# Patient Record
Sex: MALE | Race: WHITE | NOT HISPANIC OR LATINO | Employment: FULL TIME | URBAN - METROPOLITAN AREA
[De-identification: names, ages, dates, MRNs, and addresses within clinical notes are randomized per-mention and may not be internally consistent; named-entity substitution may affect disease eponyms.]

---

## 2022-05-02 ENCOUNTER — TELEPHONE (OUTPATIENT)
Dept: BEHAVIORAL HEALTH | Facility: CLINIC | Age: 32
End: 2022-05-02
Payer: COMMERCIAL

## 2022-05-02 ENCOUNTER — HOSPITAL ENCOUNTER (EMERGENCY)
Facility: CLINIC | Age: 32
Discharge: HOME | End: 2022-05-03
Attending: EMERGENCY MEDICINE | Admitting: EMERGENCY MEDICINE
Payer: COMMERCIAL

## 2022-05-02 DIAGNOSIS — F16.10 HALLUCINOGENIC MUSHROOMS USE DISORDER, MILD (H): ICD-10-CM

## 2022-05-02 DIAGNOSIS — Z71.1 MENTAL HEALTH-RELATED COMPLAINT: ICD-10-CM

## 2022-05-02 DIAGNOSIS — R45.851 SUICIDE IDEATION: ICD-10-CM

## 2022-05-02 DIAGNOSIS — R45.850 HOMICIDAL IDEATION: ICD-10-CM

## 2022-05-02 LAB
ALBUMIN SERPL-MCNC: 4.1 G/DL (ref 3.4–5)
ALP SERPL-CCNC: 53 U/L (ref 40–150)
ALT SERPL W P-5'-P-CCNC: 45 U/L (ref 0–70)
AMPHETAMINES UR QL SCN: ABNORMAL
ANION GAP SERPL CALCULATED.3IONS-SCNC: 6 MMOL/L (ref 3–14)
AST SERPL W P-5'-P-CCNC: 20 U/L (ref 0–45)
BARBITURATES UR QL: ABNORMAL
BASOPHILS # BLD AUTO: 0 10E3/UL (ref 0–0.2)
BASOPHILS NFR BLD AUTO: 1 %
BENZODIAZ UR QL: ABNORMAL
BILIRUB SERPL-MCNC: 0.3 MG/DL (ref 0.2–1.3)
BUN SERPL-MCNC: 12 MG/DL (ref 7–30)
CALCIUM SERPL-MCNC: 9.4 MG/DL (ref 8.5–10.1)
CANNABINOIDS UR QL SCN: ABNORMAL
CHLORIDE BLD-SCNC: 110 MMOL/L (ref 94–109)
CO2 SERPL-SCNC: 24 MMOL/L (ref 20–32)
COCAINE UR QL: ABNORMAL
CREAT SERPL-MCNC: 1.03 MG/DL (ref 0.66–1.25)
EOSINOPHIL # BLD AUTO: 0.2 10E3/UL (ref 0–0.7)
EOSINOPHIL NFR BLD AUTO: 3 %
ERYTHROCYTE [DISTWIDTH] IN BLOOD BY AUTOMATED COUNT: 12.6 % (ref 10–15)
GFR SERPL CREATININE-BSD FRML MDRD: >90 ML/MIN/1.73M2
GLUCOSE BLD-MCNC: 118 MG/DL (ref 70–99)
HCT VFR BLD AUTO: 45.2 % (ref 40–53)
HGB BLD-MCNC: 15.5 G/DL (ref 13.3–17.7)
IMM GRANULOCYTES # BLD: 0 10E3/UL
IMM GRANULOCYTES NFR BLD: 1 %
LYMPHOCYTES # BLD AUTO: 2 10E3/UL (ref 0.8–5.3)
LYMPHOCYTES NFR BLD AUTO: 26 %
MCH RBC QN AUTO: 30.9 PG (ref 26.5–33)
MCHC RBC AUTO-ENTMCNC: 34.3 G/DL (ref 31.5–36.5)
MCV RBC AUTO: 90 FL (ref 78–100)
MONOCYTES # BLD AUTO: 0.8 10E3/UL (ref 0–1.3)
MONOCYTES NFR BLD AUTO: 10 %
NEUTROPHILS # BLD AUTO: 4.8 10E3/UL (ref 1.6–8.3)
NEUTROPHILS NFR BLD AUTO: 59 %
NRBC # BLD AUTO: 0 10E3/UL
NRBC BLD AUTO-RTO: 0 /100
OPIATES UR QL SCN: ABNORMAL
PLATELET # BLD AUTO: 270 10E3/UL (ref 150–450)
POTASSIUM BLD-SCNC: 3.7 MMOL/L (ref 3.4–5.3)
PROT SERPL-MCNC: 7.1 G/DL (ref 6.8–8.8)
RBC # BLD AUTO: 5.02 10E6/UL (ref 4.4–5.9)
SARS-COV-2 RNA RESP QL NAA+PROBE: NEGATIVE
SODIUM SERPL-SCNC: 140 MMOL/L (ref 133–144)
WBC # BLD AUTO: 7.9 10E3/UL (ref 4–11)

## 2022-05-02 PROCEDURE — 250N000013 HC RX MED GY IP 250 OP 250 PS 637: Performed by: EMERGENCY MEDICINE

## 2022-05-02 PROCEDURE — 80307 DRUG TEST PRSMV CHEM ANLYZR: CPT | Performed by: EMERGENCY MEDICINE

## 2022-05-02 PROCEDURE — U0003 INFECTIOUS AGENT DETECTION BY NUCLEIC ACID (DNA OR RNA); SEVERE ACUTE RESPIRATORY SYNDROME CORONAVIRUS 2 (SARS-COV-2) (CORONAVIRUS DISEASE [COVID-19]), AMPLIFIED PROBE TECHNIQUE, MAKING USE OF HIGH THROUGHPUT TECHNOLOGIES AS DESCRIBED BY CMS-2020-01-R: HCPCS | Performed by: EMERGENCY MEDICINE

## 2022-05-02 PROCEDURE — 80053 COMPREHEN METABOLIC PANEL: CPT | Performed by: EMERGENCY MEDICINE

## 2022-05-02 PROCEDURE — 99285 EMERGENCY DEPT VISIT HI MDM: CPT | Mod: 25 | Performed by: EMERGENCY MEDICINE

## 2022-05-02 PROCEDURE — 99285 EMERGENCY DEPT VISIT HI MDM: CPT | Performed by: EMERGENCY MEDICINE

## 2022-05-02 PROCEDURE — 90791 PSYCH DIAGNOSTIC EVALUATION: CPT

## 2022-05-02 PROCEDURE — 85048 AUTOMATED LEUKOCYTE COUNT: CPT | Performed by: EMERGENCY MEDICINE

## 2022-05-02 PROCEDURE — 85018 HEMOGLOBIN: CPT | Performed by: EMERGENCY MEDICINE

## 2022-05-02 PROCEDURE — 36415 COLL VENOUS BLD VENIPUNCTURE: CPT | Performed by: EMERGENCY MEDICINE

## 2022-05-02 RX ORDER — LAMOTRIGINE 200 MG/1
200 TABLET ORAL DAILY
Status: ON HOLD | COMMUNITY
Start: 2022-04-19 | End: 2022-05-23

## 2022-05-02 RX ORDER — LITHIUM CARBONATE 600 MG/1
600 CAPSULE ORAL 2 TIMES DAILY
Status: DISCONTINUED | OUTPATIENT
Start: 2022-05-02 | End: 2022-05-03

## 2022-05-02 RX ORDER — ALBUTEROL SULFATE 90 UG/1
1-2 AEROSOL, METERED RESPIRATORY (INHALATION) EVERY 4 HOURS PRN
Status: ON HOLD | COMMUNITY
Start: 2022-04-13 | End: 2022-05-23

## 2022-05-02 RX ORDER — PROPRANOLOL HYDROCHLORIDE 40 MG/1
40 TABLET ORAL 3 TIMES DAILY
COMMUNITY
End: 2022-05-02

## 2022-05-02 RX ORDER — PROPRANOLOL HCL 60 MG
60 CAPSULE, EXTENDED RELEASE 24HR ORAL DAILY
Status: DISCONTINUED | OUTPATIENT
Start: 2022-05-03 | End: 2022-05-03 | Stop reason: HOSPADM

## 2022-05-02 RX ORDER — LAMOTRIGINE 200 MG/1
200 TABLET ORAL DAILY
Status: DISCONTINUED | OUTPATIENT
Start: 2022-05-03 | End: 2022-05-03 | Stop reason: HOSPADM

## 2022-05-02 RX ORDER — PROPRANOLOL HCL 60 MG
60 CAPSULE, EXTENDED RELEASE 24HR ORAL DAILY
Status: ON HOLD | COMMUNITY
Start: 2022-04-19 | End: 2022-05-23

## 2022-05-02 RX ORDER — ALBUTEROL SULFATE 90 UG/1
1-2 AEROSOL, METERED RESPIRATORY (INHALATION) EVERY 4 HOURS PRN
Status: DISCONTINUED | OUTPATIENT
Start: 2022-05-02 | End: 2022-05-03 | Stop reason: HOSPADM

## 2022-05-02 RX ORDER — QUETIAPINE FUMARATE 25 MG/1
25 TABLET, FILM COATED ORAL AT BEDTIME
Status: DISCONTINUED | OUTPATIENT
Start: 2022-05-02 | End: 2022-05-03 | Stop reason: HOSPADM

## 2022-05-02 RX ORDER — QUETIAPINE FUMARATE 25 MG/1
TABLET, FILM COATED ORAL
COMMUNITY
Start: 2021-07-13 | End: 2022-05-17

## 2022-05-02 RX ORDER — LITHIUM CARBONATE 600 MG/1
CAPSULE ORAL
COMMUNITY
Start: 2022-04-19 | End: 2022-05-17

## 2022-05-02 RX ADMIN — QUETIAPINE FUMARATE 25 MG: 25 TABLET ORAL at 23:28

## 2022-05-02 RX ADMIN — LITHIUM CARBONATE 600 MG: 600 CAPSULE ORAL at 23:28

## 2022-05-02 NOTE — ED NOTES
"[facility] ED Mental Health Handoff Note:       Brief HPI:  This is a 32 year old male signed out to me by Dr. Bowens.  See initial ED Provider note for full details of the presentation. Interval history is pertinent for patient with HI on shrooms. Placed on 72 hour hold.    Home meds reviewed and ordered/administered: Yes    Medically stable for inpatient mental health admission: Yes.    Evaluated by mental health: Yes. The recommendation is for inpatient mental health treatment. Bed search in process    Safety concerns: At the time I received sign out, there were no safety concerns.    Hold Status:  Active Orders   Legal    Emergency Hospitalization Hold (72 Hr Hold)     Frequency: Effective Now     Start Date/Time: 05/02/22 0354      Number of Occurrences: Until Specified            Exam:   Patient Vitals for the past 24 hrs:   BP Temp Temp src Pulse Resp SpO2 Height Weight   05/02/22 0915 (!) 129/90 97.7  F (36.5  C) Oral 55 18 99 % -- --   05/02/22 0031 135/88 99.7  F (37.6  C) Oral 78 16 97 % -- --   05/02/22 0027 -- -- -- -- -- -- 1.803 m (5' 11\") 82.6 kg (182 lb)           ED Course:    Medications - No data to display         There were no significant events during my shift.    Patient was signed out to the oncoming provider, Dr. Barrios.      Impression:    ICD-10-CM    1. Mental health-related complaint  Z71.1    2. Suicide ideation  R45.851    3. Homicidal ideation  R45.850        Plan:    1. Awaiting inpatient mental health admission/transfer.      RESULTS:   Results for orders placed or performed during the hospital encounter of 05/02/22 (from the past 24 hour(s))   Urine Drugs of Abuse Screen     Status: Abnormal    Collection Time: 05/02/22  9:14 AM    Narrative    The following orders were created for panel order Urine Drugs of Abuse Screen.  Procedure                               Abnormality         Status                     ---------                               -----------         " ------                     Drug abuse screen 1 urin...[[id number]]  Abnormal            Final result                 Please view results for these tests on the individual orders.   Drug abuse screen 1 urine (ED)     Status: Abnormal    Collection Time: 05/02/22  9:14 AM   Result Value Ref Range    Amphetamines Urine Screen Negative Screen Negative    Barbiturates Urine Screen Negative Screen Negative    Benzodiazepines Urine Screen Negative Screen Negative    Cannabinoids Urine Screen Positive (A) Screen Negative    Cocaine Urine Screen Negative Screen Negative    Opiates Urine Screen Negative Screen Negative   Asymptomatic COVID-19 Virus (Coronavirus) by PCR Nose     Status: Normal    Collection Time: 05/02/22  9:26 AM    Specimen: Nose; Swab   Result Value Ref Range    SARS CoV2 PCR Negative Negative    Narrative    Testing was performed using the geovany  SARS-CoV-2 & Influenza A/B Assay on the geovany  Rosemary  System.  This test should be ordered for the detection of SARS-COV-2 in individuals who meet SARS-CoV-2 clinical and/or epidemiological criteria. Test performance is unknown in asymptomatic patients.  This test is for in vitro diagnostic use under the FDA EUA for laboratories certified under CLIA to perform moderate and/or high complexity testing. This test has not been FDA cleared or approved.  A negative test does not rule out the presence of PCR inhibitors in the specimen or target RNA in concentration below the limit of detection for the assay. The possibility of a false negative should be considered if the patient's recent exposure or clinical presentation suggests COVID-19.  [facility] are certified under the Clinical Laboratory Improvement Amendments of 1988 (CLIA-88) as qualified to perform moderate and/or high complexity laboratory testing.   CBC with platelets differential     Status: None    Collection Time: 05/02/22  9:27 AM    Narrative    The following orders were created for  panel order CBC with platelets differential.  Procedure                               Abnormality         Status                     ---------                               -----------         ------                     CBC with platelets and d...[[id number]]                      Final result                 Please view results for these tests on the individual orders.   Comprehensive metabolic panel     Status: Abnormal    Collection Time: 05/02/22  9:27 AM   Result Value Ref Range    Sodium 140 133 - 144 mmol/L    Potassium 3.7 3.4 - 5.3 mmol/L    Chloride 110 (H) 94 - 109 mmol/L    Carbon Dioxide (CO2) 24 20 - 32 mmol/L    Anion Gap 6 3 - 14 mmol/L    Urea Nitrogen 12 7 - 30 mg/dL    Creatinine 1.03 0.66 - 1.25 mg/dL    Calcium 9.4 8.5 - 10.1 mg/dL    Glucose 118 (H) 70 - 99 mg/dL    Alkaline Phosphatase 53 40 - 150 U/L    AST 20 0 - 45 U/L    ALT 45 0 - 70 U/L    Protein Total 7.1 6.8 - 8.8 g/dL    Albumin 4.1 3.4 - 5.0 g/dL    Bilirubin Total 0.3 0.2 - 1.3 mg/dL    GFR Estimate >90 >60 mL/min/1.73m2   CBC with platelets and differential     Status: None    Collection Time: 05/02/22  9:27 AM   Result Value Ref Range    WBC Count 7.9 4.0 - 11.0 10e3/uL    RBC Count 5.02 4.40 - 5.90 10e6/uL    Hemoglobin 15.5 13.3 - 17.7 g/dL    Hematocrit 45.2 40.0 - 53.0 %    MCV 90 78 - 100 fL    MCH 30.9 26.5 - 33.0 pg    MCHC 34.3 31.5 - 36.5 g/dL    RDW 12.6 10.0 - 15.0 %    Platelet Count 270 150 - 450 10e3/uL    % Neutrophils 59 %    % Lymphocytes 26 %    % Monocytes 10 %    % Eosinophils 3 %    % Basophils 1 %    % Immature Granulocytes 1 %    NRBCs per 100 WBC 0 <1 /100    Absolute Neutrophils 4.8 1.6 - 8.3 10e3/uL    Absolute Lymphocytes 2.0 0.8 - 5.3 10e3/uL    Absolute Monocytes 0.8 0.0 - 1.3 10e3/uL    Absolute Eosinophils 0.2 0.0 - 0.7 10e3/uL    Absolute Basophils 0.0 0.0 - 0.2 10e3/uL    Absolute Immature Granulocytes 0.0 <=0.4 10e3/uL    Absolute NRBCs 0.0 10e3/uL             Rachel Delgado MD                      Rachel Delgado MD  05/02/22 5128
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
## 2022-05-02 NOTE — PROGRESS NOTES
Patient can call friend who brought him in, Darshana Man at [phone] or [phone].  She stated that he won't have his phone and he will probably want to call her.

## 2022-05-02 NOTE — ED NOTES
"[facility] ED Mental Health Handoff Note:       Brief HPI:  This is a 32 year old male signed out to me by Dr. Barrios.  See initial ED Provider note for full details of the presentation. No new events.    Home meds reviewed and ordered/administered:     Medically stable for inpatient mental health admission: Yes.    Evaluated by mental health: Yes. The recommendation is for inpatient mental health treatment. Bed search in process    Safety concerns: At the time I received sign out, calm and cooperative    Hold Status:  Active Orders   Legal    Emergency Hospitalization Hold (72 Hr Hold)     Frequency: Effective Now     Start Date/Time: 05/02/22 0354      Number of Occurrences: Until Specified            Exam:   Patient Vitals for the past 24 hrs:   BP Temp Temp src Pulse Resp SpO2 Height Weight   05/02/22 0915 (!) 129/90 97.7  F (36.5  C) Oral 55 18 99 % -- --   05/02/22 0031 135/88 99.7  F (37.6  C) Oral 78 16 97 % -- --   05/02/22 0027 -- -- -- -- -- -- 1.803 m (5' 11\") 82.6 kg (182 lb)       ED Course:    Medications - No data to display         There were no significant events during my shift.    Patient was signed out to the oncoming provider, Dr. Delgado      Impression:    ICD-10-CM    1. Mental health-related complaint  Z71.1    2. Suicide ideation  R45.851    3. Homicidal ideation  R45.850        Plan:    1. Awaiting inpatient mental health admission/transfer.      RESULTS:   Results for orders placed or performed during the hospital encounter of 05/02/22 (from the past 24 hour(s))   Urine Drugs of Abuse Screen     Status: Abnormal    Collection Time: 05/02/22  9:14 AM    Narrative    The following orders were created for panel order Urine Drugs of Abuse Screen.  Procedure                               Abnormality         Status                     ---------                               -----------         ------                     Drug abuse screen 1 urin...[[id number]]  Abnormal            Final " result                 Please view results for these tests on the individual orders.   Drug abuse screen 1 urine (ED)     Status: Abnormal    Collection Time: 05/02/22  9:14 AM   Result Value Ref Range    Amphetamines Urine Screen Negative Screen Negative    Barbiturates Urine Screen Negative Screen Negative    Benzodiazepines Urine Screen Negative Screen Negative    Cannabinoids Urine Screen Positive (A) Screen Negative    Cocaine Urine Screen Negative Screen Negative    Opiates Urine Screen Negative Screen Negative   Asymptomatic COVID-19 Virus (Coronavirus) by PCR Nose     Status: Normal    Collection Time: 05/02/22  9:26 AM    Specimen: Nose; Swab   Result Value Ref Range    SARS CoV2 PCR Negative Negative    Narrative    Testing was performed using the geovany  SARS-CoV-2 & Influenza A/B Assay on the geovany  Rosemary  System.  This test should be ordered for the detection of SARS-COV-2 in individuals who meet SARS-CoV-2 clinical and/or epidemiological criteria. Test performance is unknown in asymptomatic patients.  This test is for in vitro diagnostic use under the FDA EUA for laboratories certified under CLIA to perform moderate and/or high complexity testing. This test has not been FDA cleared or approved.  A negative test does not rule out the presence of PCR inhibitors in the specimen or target RNA in concentration below the limit of detection for the assay. The possibility of a false negative should be considered if the patient's recent exposure or clinical presentation suggests COVID-19.  [facility] are certified under the Clinical Laboratory Improvement Amendments of 1988 (CLIA-88) as qualified to perform moderate and/or high complexity laboratory testing.   CBC with platelets differential     Status: None    Collection Time: 05/02/22  9:27 AM    Narrative    The following orders were created for panel order CBC with platelets differential.  Procedure                                Abnormality         Status                     ---------                               -----------         ------                     CBC with platelets and d...[[id number]]                      Final result                 Please view results for these tests on the individual orders.   Comprehensive metabolic panel     Status: Abnormal    Collection Time: 05/02/22  9:27 AM   Result Value Ref Range    Sodium 140 133 - 144 mmol/L    Potassium 3.7 3.4 - 5.3 mmol/L    Chloride 110 (H) 94 - 109 mmol/L    Carbon Dioxide (CO2) 24 20 - 32 mmol/L    Anion Gap 6 3 - 14 mmol/L    Urea Nitrogen 12 7 - 30 mg/dL    Creatinine 1.03 0.66 - 1.25 mg/dL    Calcium 9.4 8.5 - 10.1 mg/dL    Glucose 118 (H) 70 - 99 mg/dL    Alkaline Phosphatase 53 40 - 150 U/L    AST 20 0 - 45 U/L    ALT 45 0 - 70 U/L    Protein Total 7.1 6.8 - 8.8 g/dL    Albumin 4.1 3.4 - 5.0 g/dL    Bilirubin Total 0.3 0.2 - 1.3 mg/dL    GFR Estimate >90 >60 mL/min/1.73m2   CBC with platelets and differential     Status: None    Collection Time: 05/02/22  9:27 AM   Result Value Ref Range    WBC Count 7.9 4.0 - 11.0 10e3/uL    RBC Count 5.02 4.40 - 5.90 10e6/uL    Hemoglobin 15.5 13.3 - 17.7 g/dL    Hematocrit 45.2 40.0 - 53.0 %    MCV 90 78 - 100 fL    MCH 30.9 26.5 - 33.0 pg    MCHC 34.3 31.5 - 36.5 g/dL    RDW 12.6 10.0 - 15.0 %    Platelet Count 270 150 - 450 10e3/uL    % Neutrophils 59 %    % Lymphocytes 26 %    % Monocytes 10 %    % Eosinophils 3 %    % Basophils 1 %    % Immature Granulocytes 1 %    NRBCs per 100 WBC 0 <1 /100    Absolute Neutrophils 4.8 1.6 - 8.3 10e3/uL    Absolute Lymphocytes 2.0 0.8 - 5.3 10e3/uL    Absolute Monocytes 0.8 0.0 - 1.3 10e3/uL    Absolute Eosinophils 0.2 0.0 - 0.7 10e3/uL    Absolute Basophils 0.0 0.0 - 0.2 10e3/uL    Absolute Immature Granulocytes 0.0 <=0.4 10e3/uL    Absolute NRBCs 0.0 10e3/uL             MD Bri Trotter Kyle, MD  05/02/22 0629
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
## 2022-05-02 NOTE — ED NOTES
Mother calls this writer to communicate reactions to medications that have worked or not worked in the past.    According to Mother     Seroquel - has been working well    Zyprexa - has worked in the past but recently the medication caused liver issues and vision problems.      Risperdal - causes sever vision problems.

## 2022-05-02 NOTE — ED NOTES
Patient currently boarding in the ED with plan of admission, patient came in, unsteady on feet, unable to participate in a conversation. Patient woke up and ate snacks brought by friend then patient has been sleeping since. Attempted to do COVID swab and give 72 hour hold paper, but unable to stay up. No behaviors noted.
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
## 2022-05-02 NOTE — ED NOTES
Belongings:  Patient safety screened.   Friend kept wallet/money.  Phone with patient.   Locker:  -backpack, shoes, sweater : 2 [facility] belonging bags locker 32
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
## 2022-05-03 ENCOUNTER — TELEPHONE (OUTPATIENT)
Dept: BEHAVIORAL HEALTH | Facility: CLINIC | Age: 32
End: 2022-05-03

## 2022-05-03 VITALS
OXYGEN SATURATION: 99 % | RESPIRATION RATE: 16 BRPM | SYSTOLIC BLOOD PRESSURE: 132 MMHG | HEIGHT: 71 IN | DIASTOLIC BLOOD PRESSURE: 88 MMHG | TEMPERATURE: 97.9 F | BODY MASS INDEX: 25.48 KG/M2 | WEIGHT: 182 LBS | HEART RATE: 56 BPM

## 2022-05-03 PROCEDURE — 250N000013 HC RX MED GY IP 250 OP 250 PS 637: Performed by: EMERGENCY MEDICINE

## 2022-05-03 RX ORDER — LITHIUM CARBONATE 600 MG/1
600 CAPSULE ORAL ONCE
Status: COMPLETED | OUTPATIENT
Start: 2022-05-03 | End: 2022-05-03

## 2022-05-03 RX ORDER — LITHIUM CARBONATE 600 MG/1
1200 CAPSULE ORAL AT BEDTIME
Status: DISCONTINUED | OUTPATIENT
Start: 2022-05-04 | End: 2022-05-03 | Stop reason: HOSPADM

## 2022-05-03 RX ORDER — LITHIUM CARBONATE 600 MG/1
1200 CAPSULE ORAL AT BEDTIME
Status: DISCONTINUED | OUTPATIENT
Start: 2022-05-03 | End: 2022-05-03

## 2022-05-03 RX ORDER — LITHIUM CARBONATE 600 MG/1
600 CAPSULE ORAL EVERY MORNING
Status: DISCONTINUED | OUTPATIENT
Start: 2022-05-03 | End: 2022-05-03 | Stop reason: HOSPADM

## 2022-05-03 RX ADMIN — LITHIUM CARBONATE 600 MG: 600 CAPSULE ORAL at 00:42

## 2022-05-03 RX ADMIN — LITHIUM CARBONATE 600 MG: 600 CAPSULE ORAL at 10:49

## 2022-05-03 RX ADMIN — PROPRANOLOL HYDROCHLORIDE 60 MG: 60 CAPSULE, EXTENDED RELEASE ORAL at 10:50

## 2022-05-03 RX ADMIN — LAMOTRIGINE 200 MG: 200 TABLET ORAL at 10:50

## 2022-05-03 NOTE — ED NOTES
[facility] ED Mental Health Handoff Note:       Brief HPI:  This is a 32 year old male signed out to me by Dr. Barrios.  See initial ED Provider note for full details of the presentation. Interval history is pertinent for HI now resolved.    Home meds reviewed and ordered/administered: Yes    Medically stable for inpatient mental health admission: Yes.    Evaluated by mental health: Yes. The recommendation is for outpatient mental health treatment. Resources and plan given to patient.    Safety concerns: At the time I received sign out, there were no safety concerns.    Hold Status:  Active Orders   N/A            Exam:   No data found.          ED Course:    Medications   lithium (ESKALITH) capsule 600 mg (600 mg Oral Given 5/3/22 0042)            There were no significant events during my shift.  Patient reassessed by extended care mental health team in the ER.  They reassessed patient patient had made some homicidal ideations after doing mushrooms now seems to have all the metabolites of the system patient now is appropriate denies being actively suicidal homicidal patient's friend who is here he had made threats to her also and he had made some suicidal ideations which is not typical for him patient feels as well related to the hallucinogens and has no concerns or feelings of any safety concerns at all is not feeling homicidal is not suicidal denies hallucinations otherwise.  Is comfortable going home he has mental health team set up.  Patient friend who is here also is comfortable having the patient go home and feels very safe does feel like there is any safety concerns at this point.    Patient was initially placed on a 72-hour hold and this is now been discontinued seems very appropriate here in the ER cooperative authentic has no intent of any harm etc. appears to be clinically sober and appropriate does not disconnected from reality.          Impression:    ICD-10-CM    1. Mental health-related  complaint  Z71.1    2. Suicide ideation  R45.851     resolved   3. Homicidal ideation  R45.850     resolved   4. 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 Discharged.      RESULTS:   No results found for this visit on 05/02/22 (from the past 24 hour(s)).          Edison Cast MD    This note was created at least in part by the use of dragon voice dictation system. Inadvertent typographical errors may still exist.  Edison Cast MD.    Patient evaluated in the emergency department during the COVID-19 pandemic period. Careful attention to patients safety was addressed throughout the evaluation. Evaluation and treatment management was initiated with disposition made efficiently and appropriate as possible to minimize any risk of potential exposure to patient during this evaluation.                       Edison Cast MD  05/03/22 [id number]

## 2022-05-03 NOTE — TELEPHONE ENCOUNTER
R:  No appropriate beds within [facility] system - Bed search update @ 04:28:    [facility]: @ cap per website  [facility]:@ cap per website  [facility]: @ cap per website  [facility]: @ cap per website  [facility]: @ cap per website  [facility]: @ cap per website  [facility]: @ cap per website  [facility]: @ cap per website  [facility]: @ cap per website  [facility]: @ cap per website  [facility]: @ cap per website  [facility]: @ cap per website  [facility]: Posting beds. Per Yanelis @ 04:15, [facility] only has a very low acuity bed avail. Pt not approp for current bed available  [facility]: @ cap per website   [facility]: @ cap per website  [facility]: @ cap per website    [facility]: @ cap per website  [facility]: @ cap per website  [facility]: @ cap per website  [facility]: Posting 2 beds. Per previous call leticia Mack @ 02:01, she is already reviewing beyond her bed capacity for tonight and requests intake call back after 9AM.   [facility]: @ cap per website    Pt remains on work list until appropriate placement is available
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
## 2022-05-03 NOTE — ED PROVIDER NOTES
Patient seen by extended care in the ER and re evaluated by myself.  Patient initially seen for homicidal ideation suicidal ideations over 30 hours ago.  Patient is replaced at 72 hour -old hold plan to be admitted to to mental health.  Patient been reassessed by extended care today.  They feel at this point patient had hallucinogenic induced psychological symptoms as currently now he is clear not feel suicidal not homicidal his friend who is here also feels comfortable with him going home the friend here in the ER is 1 but the patient made some homicidal ideations to which is not typical for himself he denies any active suicidal homicidal ideations currently.  Discussed with our extended care person the duty to warn had been addressed as friend who is here is 1 that the homicidal threats were made to which they feel are not authentic in him more.  They feel there is no safety concerns at all.  Patient this point will be discharged after his 72-hour hold was discontinued.  Patient follow-up with his mental health team and avoiding any hallucinogenic's etc. and return to concerns.    This note was created at least in part by the use of dragon voice dictation system. Inadvertent typographical errors may still exist.  Edison Cast MD.    Patient evaluated in the emergency department during the COVID-19 pandemic period. Careful attention to patients safety was addressed throughout the evaluation. Evaluation and treatment management was initiated with disposition made efficiently and appropriate as possible to minimize any risk of potential exposure to patient during this evaluation.       Edison Cast MD  05/03/22 6753

## 2022-05-03 NOTE — DISCHARGE INSTRUCTIONS
Home with friend in the ER.  You were seen by mental health team in the ER.  You have been in the ER for over 30 hours and now feel fine.   You do not feel suicidal or homicidal currently and feel safe going home with friend who also agrees you are appropriate to go home.  Continue current medications.  Avoid mushroom or other chemical drug use that could affect your mental status.  Return if any concerns.  See your mental health team as planned with therapist next week.      Please make an appointment to follow up with Your Primary Care Provider, [facility] (phone: [phone]), and [facility] (phone: [phone]) as soon as possible as needed.    Aftercare Plan  If I am feeling unsafe or I am in a crisis, I will:   Contact my established care providers   Call the National Suicide Prevention Lifeline: 309.677.2648   Go to the nearest emergency room   Call 911     Warning signs that I or other people might notice when a crisis is developing for me: not sleeping, increased irritability or anger, increased drug or alcohol use, more emotional    Things I am able to do on my own to cope or help me feel better: watching something on tv like Friends, The Office, or something new, exercise or play golf, watch a movie, listen to music, meditate, play with my dog     Things that I am able to do with others to cope or help me better: talk, FaceTime, or hang out with friends, be around people     Things I can use or do for distraction: music, tv/movies, my dog, exercise, meditation     Changes I can make to support my mental health and wellness: Work with my therapist on managing my anger and improving my relationships, decrease my drug/alcohol use, pay attention to early warning signs of emerging mary or other mental health crisis, and reach out to my therapist, psychiatrist or the [facility] crisis team asap for help.     People in my life that I can ask for help: parents, Darshana,  "Prince Allred Bryce, Andrew     Your [facility] has a mental health crisis team you can call 24/7: [facility] Children, [phone], [facility] Adult, [phone] and [facility], [phone]    Other things that are important when I m in crisis: Reach out to my family, friends or team for help. Avoid drugs and alcohol.     Crisis Lines  Crisis Text Line  Text 202590  You will be connected with a trained live crisis counselor to provide support.    National Hope Line  1.800.SUICIDE [8108157]      Community Resources  Fast Tracker  Linking people to mental health and substance use disorder resources  fastMarijuanaStocksIndex.comckSkySpecsn.org     [place] Mental Health Warm Line  Peer to peer support  Monday thru Saturday, 12 pm to 10 pm  445.278.3523 or 5.766.099.8713  Text \"Support\" to 79701    National [place] on Mental Illness (ALVA)  774.561.1414 or 1.888.ALVA.HELPS      Mental Health Apps  My3  https://my3app.org/    VirtualHopeBox  https://Fontacto.org/apps/virtual-hope-box/        "

## 2022-05-16 ENCOUNTER — HOSPITAL ENCOUNTER (INPATIENT)
Facility: CLINIC | Age: 32
LOS: 3 days | Discharge: HOME | DRG: 885 | End: 2022-05-23
Attending: PSYCHIATRY & NEUROLOGY | Admitting: PSYCHIATRY & NEUROLOGY
Payer: COMMERCIAL

## 2022-05-16 DIAGNOSIS — F19.20 CHEMICAL DEPENDENCY (H): ICD-10-CM

## 2022-05-16 DIAGNOSIS — J45.20 MILD INTERMITTENT ASTHMA WITHOUT COMPLICATION: Primary | ICD-10-CM

## 2022-05-16 DIAGNOSIS — F31.12 BIPOLAR AFFECTIVE DISORDER, CURRENTLY MANIC, MODERATE (H): ICD-10-CM

## 2022-05-16 DIAGNOSIS — Z11.52 ENCOUNTER FOR SCREENING LABORATORY TESTING FOR SEVERE ACUTE RESPIRATORY SYNDROME CORONAVIRUS 2 (SARS-COV-2): ICD-10-CM

## 2022-05-16 PROCEDURE — 99285 EMERGENCY DEPT VISIT HI MDM: CPT | Mod: 25 | Performed by: PSYCHIATRY & NEUROLOGY

## 2022-05-16 PROCEDURE — 90791 PSYCH DIAGNOSTIC EVALUATION: CPT

## 2022-05-16 PROCEDURE — C9803 HOPD COVID-19 SPEC COLLECT: HCPCS | Performed by: PSYCHIATRY & NEUROLOGY

## 2022-05-16 PROCEDURE — 99284 EMERGENCY DEPT VISIT MOD MDM: CPT | Performed by: PSYCHIATRY & NEUROLOGY

## 2022-05-16 RX ORDER — LAMOTRIGINE 200 MG/1
200 TABLET ORAL DAILY
Status: DISCONTINUED | OUTPATIENT
Start: 2022-05-17 | End: 2022-05-17

## 2022-05-16 RX ORDER — LITHIUM CARBONATE 300 MG/1
600 TABLET, FILM COATED, EXTENDED RELEASE ORAL EVERY MORNING
Status: DISCONTINUED | OUTPATIENT
Start: 2022-05-17 | End: 2022-05-23 | Stop reason: HOSPADM

## 2022-05-16 RX ORDER — PROPRANOLOL HCL 60 MG
60 CAPSULE, EXTENDED RELEASE 24HR ORAL DAILY
Status: DISCONTINUED | OUTPATIENT
Start: 2022-05-17 | End: 2022-05-23 | Stop reason: HOSPADM

## 2022-05-16 RX ORDER — QUETIAPINE FUMARATE 100 MG/1
100 TABLET, FILM COATED ORAL AT BEDTIME
Status: DISCONTINUED | OUTPATIENT
Start: 2022-05-17 | End: 2022-05-23 | Stop reason: HOSPADM

## 2022-05-16 RX ORDER — ALBUTEROL SULFATE 90 UG/1
2 AEROSOL, METERED RESPIRATORY (INHALATION) EVERY 6 HOURS PRN
Status: DISCONTINUED | OUTPATIENT
Start: 2022-05-16 | End: 2022-05-23 | Stop reason: HOSPADM

## 2022-05-16 RX ORDER — OLANZAPINE 10 MG/1
10 TABLET, ORALLY DISINTEGRATING ORAL ONCE
Status: DISCONTINUED | OUTPATIENT
Start: 2022-05-16 | End: 2022-05-16

## 2022-05-16 RX ORDER — QUETIAPINE FUMARATE 100 MG/1
100 TABLET, FILM COATED ORAL ONCE
Status: DISCONTINUED | OUTPATIENT
Start: 2022-05-16 | End: 2022-05-17

## 2022-05-16 ASSESSMENT — ENCOUNTER SYMPTOMS
NERVOUS/ANXIOUS: 1
EYES NEGATIVE: 1
DECREASED CONCENTRATION: 1
ACTIVITY CHANGE: 1
GASTROINTESTINAL NEGATIVE: 1
MUSCULOSKELETAL NEGATIVE: 1
HALLUCINATIONS: 1
NEUROLOGICAL NEGATIVE: 1
CARDIOVASCULAR NEGATIVE: 1
HYPERACTIVE: 1
RESPIRATORY NEGATIVE: 1
SLEEP DISTURBANCE: 1

## 2022-05-17 ENCOUNTER — TELEPHONE (OUTPATIENT)
Dept: BEHAVIORAL HEALTH | Facility: CLINIC | Age: 32
End: 2022-05-17

## 2022-05-17 LAB
ALBUMIN SERPL-MCNC: 4.1 G/DL (ref 3.4–5)
ALP SERPL-CCNC: 47 U/L (ref 40–150)
ALT SERPL W P-5'-P-CCNC: 29 U/L (ref 0–70)
ANION GAP SERPL CALCULATED.3IONS-SCNC: 5 MMOL/L (ref 3–14)
AST SERPL W P-5'-P-CCNC: 15 U/L (ref 0–45)
BASOPHILS # BLD AUTO: 0 10E3/UL (ref 0–0.2)
BASOPHILS NFR BLD AUTO: 0 %
BILIRUB SERPL-MCNC: 0.7 MG/DL (ref 0.2–1.3)
BUN SERPL-MCNC: 12 MG/DL (ref 7–30)
CALCIUM SERPL-MCNC: 9.6 MG/DL (ref 8.5–10.1)
CHLORIDE BLD-SCNC: 108 MMOL/L (ref 94–109)
CO2 SERPL-SCNC: 29 MMOL/L (ref 20–32)
CREAT SERPL-MCNC: 0.93 MG/DL (ref 0.66–1.25)
EOSINOPHIL # BLD AUTO: 0 10E3/UL (ref 0–0.7)
EOSINOPHIL NFR BLD AUTO: 1 %
ERYTHROCYTE [DISTWIDTH] IN BLOOD BY AUTOMATED COUNT: 12.4 % (ref 10–15)
GFR SERPL CREATININE-BSD FRML MDRD: >90 ML/MIN/1.73M2
GLUCOSE BLD-MCNC: 122 MG/DL (ref 70–99)
HCT VFR BLD AUTO: 44.3 % (ref 40–53)
HGB BLD-MCNC: 14.9 G/DL (ref 13.3–17.7)
IMM GRANULOCYTES # BLD: 0 10E3/UL
IMM GRANULOCYTES NFR BLD: 0 %
LITHIUM SERPL-SCNC: <0.2 MMOL/L
LYMPHOCYTES # BLD AUTO: 1 10E3/UL (ref 0.8–5.3)
LYMPHOCYTES NFR BLD AUTO: 19 %
MCH RBC QN AUTO: 30.5 PG (ref 26.5–33)
MCHC RBC AUTO-ENTMCNC: 33.6 G/DL (ref 31.5–36.5)
MCV RBC AUTO: 91 FL (ref 78–100)
MONOCYTES # BLD AUTO: 0.4 10E3/UL (ref 0–1.3)
MONOCYTES NFR BLD AUTO: 7 %
NEUTROPHILS # BLD AUTO: 4 10E3/UL (ref 1.6–8.3)
NEUTROPHILS NFR BLD AUTO: 73 %
NRBC # BLD AUTO: 0 10E3/UL
NRBC BLD AUTO-RTO: 0 /100
PLATELET # BLD AUTO: 215 10E3/UL (ref 150–450)
POTASSIUM BLD-SCNC: 3.7 MMOL/L (ref 3.4–5.3)
PROT SERPL-MCNC: 6.9 G/DL (ref 6.8–8.8)
RBC # BLD AUTO: 4.88 10E6/UL (ref 4.4–5.9)
SARS-COV-2 RNA RESP QL NAA+PROBE: NEGATIVE
SODIUM SERPL-SCNC: 142 MMOL/L (ref 133–144)
TSH SERPL DL<=0.005 MIU/L-ACNC: 0.61 MU/L (ref 0.4–4)
WBC # BLD AUTO: 5.5 10E3/UL (ref 4–11)

## 2022-05-17 PROCEDURE — 36415 COLL VENOUS BLD VENIPUNCTURE: CPT | Performed by: PSYCHIATRY & NEUROLOGY

## 2022-05-17 PROCEDURE — 85025 COMPLETE CBC W/AUTO DIFF WBC: CPT | Performed by: PSYCHIATRY & NEUROLOGY

## 2022-05-17 PROCEDURE — 80178 ASSAY OF LITHIUM: CPT | Performed by: PSYCHIATRY & NEUROLOGY

## 2022-05-17 PROCEDURE — 250N000011 HC RX IP 250 OP 636: Performed by: EMERGENCY MEDICINE

## 2022-05-17 PROCEDURE — 250N000013 HC RX MED GY IP 250 OP 250 PS 637: Performed by: PSYCHIATRY & NEUROLOGY

## 2022-05-17 PROCEDURE — 96372 THER/PROPH/DIAG INJ SC/IM: CPT | Performed by: EMERGENCY MEDICINE

## 2022-05-17 PROCEDURE — 87635 SARS-COV-2 COVID-19 AMP PRB: CPT | Performed by: PSYCHIATRY & NEUROLOGY

## 2022-05-17 PROCEDURE — 84443 ASSAY THYROID STIM HORMONE: CPT | Performed by: PSYCHIATRY & NEUROLOGY

## 2022-05-17 PROCEDURE — 80053 COMPREHEN METABOLIC PANEL: CPT | Performed by: PSYCHIATRY & NEUROLOGY

## 2022-05-17 RX ORDER — OLANZAPINE 10 MG/2ML
10 INJECTION, POWDER, FOR SOLUTION INTRAMUSCULAR ONCE
Status: COMPLETED | OUTPATIENT
Start: 2022-05-17 | End: 2022-05-17

## 2022-05-17 RX ORDER — LITHIUM CARBONATE 300 MG/1
600 TABLET, FILM COATED, EXTENDED RELEASE ORAL AT BEDTIME
Status: DISCONTINUED | OUTPATIENT
Start: 2022-05-17 | End: 2022-05-23 | Stop reason: HOSPADM

## 2022-05-17 RX ORDER — OLANZAPINE 10 MG/1
10 TABLET, ORALLY DISINTEGRATING ORAL ONCE
Status: DISCONTINUED | OUTPATIENT
Start: 2022-05-17 | End: 2022-05-17

## 2022-05-17 RX ORDER — LAMOTRIGINE 25 MG/1
25 TABLET ORAL DAILY
Status: DISCONTINUED | OUTPATIENT
Start: 2022-05-18 | End: 2022-05-23 | Stop reason: HOSPADM

## 2022-05-17 RX ORDER — LITHIUM CARBONATE 600 MG/1
600 CAPSULE ORAL 2 TIMES DAILY
Status: ON HOLD | COMMUNITY
End: 2022-05-23

## 2022-05-17 RX ADMIN — QUETIAPINE 100 MG: 100 TABLET ORAL at 21:37

## 2022-05-17 RX ADMIN — OLANZAPINE 10 MG: 10 INJECTION, POWDER, LYOPHILIZED, FOR SOLUTION INTRAMUSCULAR at 13:31

## 2022-05-17 RX ADMIN — LITHIUM CARBONATE 600 MG: 300 TABLET, EXTENDED RELEASE ORAL at 21:36

## 2022-05-17 RX ADMIN — OLANZAPINE 10 MG: 10 INJECTION, POWDER, LYOPHILIZED, FOR SOLUTION INTRAMUSCULAR at 02:10

## 2022-05-17 NOTE — ED NOTES
"Pt calm. When asked if he would agree to not harm staff the pt stated, \"I guess.\" Restraints were discontinued  "

## 2022-05-17 NOTE — ED NOTES
"Pt is verbally abusive, racially calling all the staffs \" you fucking nigger\" \"you chink, why are you here in this [place]! Chink! Chink! Chink! Get out!\" Pt's Mother was on the speaker phone and has heard all the verbal abuse pt hurling at staffs. Pt refused his meds.  Pt is aware of he what he is doing. When another RN, Erick was standing by the door to help de-escalate pt's behavior, pt immediately stopped his name calling and was very cordial to the other RN  Report given to Merced BLANCHARD.  "
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
## 2022-05-17 NOTE — SAFE
"Ok Owens  May 17, 2022  SAFE Note    Critical Safety Issues: Patient presented to the ED for concerns related to mary and substance abuse. Pt presented with symptoms of paranoia, irritability, emotional lability, over 48 hours without sleep, auditory and visual hallucinations, verbal aggression, and recent substance use (\"too much danya on Friday\"). Pt has a history of Bipolar disorder and polysubstance abuse. Pt is currently manic with psychotic features. Pt has been verbally abuse while in the ED, calling staff racial slurs and making derogatory comments.       Current Suicidal Ideation/Self-Injurious Concerns/Methods: None - N/A      Current or Historical Inappropriate Sexual Behavior: Unknown      Current or Historical Aggression/Homicidal Ideation: Agitation/Hyperactivity, History of Violence, Impaired Self-Control and Rage - Pt has not been physically aggressive while in the ED      Triggers: polysubstance use with his friend     Guardianship Status: is his own guardian.  Commitment from May 2012, that does not appear to have been closed.     This patient is a child/adolescent: No    This patient has additional special visitor precautions: No    Updated care team: Yes: ED    For additional details see full [facility] assessment.       TISH Cam    "
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
## 2022-05-17 NOTE — ED NOTES
"During the code 21, the pt initially agreed to cooperate. When the process was explained, the pt began saying I\"m not a kindergardener. He expressed an unwillingness to listen. Placed in 5 points  "
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
## 2022-05-17 NOTE — ED NOTES
Pt has been placed on a 72 hour hold. He has been given a copy of the hold and the pt bill of rights
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
## 2022-05-17 NOTE — ED NOTES
Within an hour after restraint an in person face to face assessment was completed at 1400 pm/, including an evaluation of the patient's immediate reaction to the intervention, behavioral assessment and review/assessment of history, drugs and medications, recent labs, etc., and behavioral condition.  The patient experienced: No adverse physical outcome from seclusion/restraint initiation.  The intervention of restraint or seclusion needs to continue.  MD Fabian Calero Alda L, MD  05/17/22 2244

## 2022-05-17 NOTE — ED NOTES
[facility] ED Mental Health Handoff Note:       Brief HPI:  This is a 32 year old male signed out to me by Dr. Dallas.  See initial ED Provider note for full details of the presentation.  Interval history is pertinent for manic behavior, referred for MH admission.    Home meds reviewed and ordered/administered: Yes    Medically stable for inpatient mental health admission: Yes.    Evaluated by mental health: Yes. The recommendation is for inpatient mental health treatment. Bed search in process    Safety concerns: At the time I received sign out, there were no safety concerns.    Hold Status:  Active Orders   Legal    Health Officer Authority to Detain (BASILIO)     Frequency: Effective Now     Start Date/Time: 05/16/22 2117      Number of Occurrences: Until Specified     Order Comments: Pt. unable to keep self safe.             Exam:   Patient Vitals for the past 24 hrs:   BP Temp Temp src Pulse Resp SpO2   05/16/22 2000 (!) 154/97 98.5  F (36.9  C) Oral 57 16 98 %           ED Course:    Medications   lamoTRIgine (LaMICtal) tablet 200 mg (has no administration in time range)   lithium ER (LITHOBID) CR tablet 600 mg (has no administration in time range)   lithium ER (LITHOBID) CR tablet 1,200 mg (1,200 mg Oral Not Given 5/16/22 2310)   propranolol ER (INDERAL LA) 24 hr capsule 60 mg (has no administration in time range)   albuterol (PROVENTIL HFA/VENTOLIN HFA) inhaler (has no administration in time range)   QUEtiapine (SEROquel) tablet 100 mg (100 mg Oral Not Given 5/17/22 0210)   OLANZapine (zyPREXA) injection 10 mg (10 mg Intramuscular Given 5/17/22 0210)            There were significant events during my shift.    Within an hour after restraint an in person face to face assessment was completed at 0150, including an evaluation of the patient's immediate reaction to the intervention, behavioral assessment and review/assessment of history, drugs and medications, recent labs, etc., and behavioral condition.  The  patient experienced: No adverse physical outcome from seclusion/restraint initiation.  The intervention of restraint or seclusion needs to continue.      Within an hour after restraint an in person face to face assessment was completed at 0225, including an evaluation of the patient's immediate reaction to the intervention, behavioral assessment and review/assessment of history, drugs and medications, recent labs, etc., and behavioral condition.  The patient experienced: No adverse physical outcome from seclusion/restraint initiation.  The intervention of restraint or seclusion needs to terminate.    Patient was signed out to the oncoming provider, Dr. Peralta      Critical Care Addendum    My initial assessment, based on my review of vital signs and focused history, established that Ok Owens has severe agitation, which requires immediate intervention, and therefore he is critically ill.     After the initial assessment, the care team initiated medication therapy with Olanzapine to provide stabilization care. Due to the critical nature of this patient, I reassessed nursing observations, mental status and neurologic status multiple times prior to his disposition.     Time also spent performing documentation and coordination of care.     Critical care time (excluding teaching time and procedures): 15 minutes.       Impression:    ICD-10-CM    1. Bipolar affective disorder, currently manic, moderate (H)  F31.12    2. Chemical dependency (H)  F19.20        Plan:    1. Awaiting inpatient mental health admission/transfer.      RESULTS:   No results found for this visit on 05/16/22 (from the past 24 hour(s)).          DO Shravan Thorpe Dustin Nickolas, DO  05/17/22 0722

## 2022-05-17 NOTE — ED NOTES
zyprexa   conducted a detailed discussion... I had a detailed discussion with the patient and/or guardian regarding the historical points, exam findings, and any diagnostic results supporting the discharge/admit diagnosis.
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
## 2022-05-17 NOTE — ED PROVIDER NOTES
ED Provider Note  [facility]      History     Chief Complaint   Patient presents with     Manic Behavior     Not sleeping for past 3 days; taking mx meds as prescribed     Hallucinations     VH last night, friend says pt is delusional     HPI  Ok Owens is a 32 year old male who is here via EMS from home where he has bene manic and unstable. Patient has history of bipolar disorder and substance abuse. He reports taking his meds yet continues to be unstable. He was seen here last week and placed on a 72 hour hold for admission, but subsequently improved while he was waiting for a bed. He was then released as he has supportive friends and family members. Patient's mary and dysregulated mood and behavior got worse and he was brought back to the ED. He was cooperative while waiting in the lobby area, then got dysregulated and wanting to leave when he was brought back. He is verbally aggressive and intrusive and makes offense remarks toward the  and staff. He was in better control when he has mother on the line. He is willing to check himself into the hospital for stabilization. He has no medical concerns. He denies COVID symptoms. He denies concerns for withdrawal.     Please see DEC Crisis Assessment on 5/16/22 in Epic for further details.    PERSONAL MEDICAL HISTORY  History reviewed. No pertinent past medical history.  PAST SURGICAL HISTORY  History reviewed. No pertinent surgical history.  FAMILY HISTORY  History reviewed. No pertinent family history.  SOCIAL HISTORY  Social History     Tobacco Use     Smoking status: Current Every Day Smoker     Types: Vaping Device     Smokeless tobacco: Never Used   Substance Use Topics     Alcohol use: Yes     Comment: drinks frequently. [employer] daily. Hard liquor on weekends.     MEDICATIONS  Current Facility-Administered Medications   Medication     albuterol (PROVENTIL HFA/VENTOLIN HFA) inhaler     [START ON 5/17/2022] lamoTRIgine  (LaMICtal) tablet 200 mg     lithium ER (LITHOBID) CR tablet 1,200 mg     [START ON 5/17/2022] lithium ER (LITHOBID) CR tablet 600 mg     [START ON 5/17/2022] propranolol ER (INDERAL LA) 24 hr capsule 60 mg     QUEtiapine (SEROquel) tablet 100 mg     QUEtiapine (SEROquel) tablet 100 mg     Current Outpatient Medications   Medication     albuterol (PROAIR HFA/PROVENTIL HFA/VENTOLIN HFA) 108 (90 Base) MCG/ACT inhaler     lamoTRIgine (LAMICTAL) 200 MG tablet     lithium (ESKALITH) 600 MG capsule     Omega-3 Fatty Acids (FISH OIL OMEGA-3 PO)     propranolol ER (INDERAL LA) 60 MG 24 hr capsule     QUEtiapine (SEROQUEL) 25 MG tablet     VITAMIN D, CHOLECALCIFEROL, PO     ALLERGIES  No Known Allergies       Review of Systems   Constitutional: Positive for activity change.   HENT: Negative.    Eyes: Negative.    Respiratory: Negative.    Cardiovascular: Negative.    Gastrointestinal: Negative.    Genitourinary: Negative.    Musculoskeletal: Negative.    Skin: Negative.    Neurological: Negative.    Psychiatric/Behavioral: Positive for behavioral problems, decreased concentration, hallucinations and sleep disturbance. The patient is nervous/anxious and is hyperactive.    All other systems reviewed and are negative.        Physical Exam   BP: (!) 154/97  Pulse: 57  Temp: 98.5  F (36.9  C)  Resp: 16  SpO2: 98 %  Physical Exam  Vitals and nursing note reviewed.   HENT:      Head: Normocephalic.   Eyes:      Pupils: Pupils are equal, round, and reactive to light.   Pulmonary:      Effort: Pulmonary effort is normal.   Musculoskeletal:         General: Normal range of motion.      Cervical back: Normal range of motion.   Neurological:      General: No focal deficit present.      Mental Status: He is alert.   Psychiatric:         Attention and Perception: He is inattentive. He does not perceive auditory or visual hallucinations.         Mood and Affect: Mood normal. Affect is labile and inappropriate.         Speech: Speech is  rapid and pressured and tangential.         Behavior: Behavior normal. Behavior is not agitated, aggressive, hyperactive or combative. Behavior is cooperative.         Thought Content: Thought content does not include homicidal or suicidal ideation.         Cognition and Memory: Cognition and memory normal.         Judgment: Judgment is impulsive and inappropriate.         ED Course      Procedures            No results found for any visits on 05/16/22.  Medications   QUEtiapine (SEROquel) tablet 100 mg (has no administration in time range)   lamoTRIgine (LaMICtal) tablet 200 mg (has no administration in time range)   lithium ER (LITHOBID) CR tablet 600 mg (has no administration in time range)   lithium ER (LITHOBID) CR tablet 1,200 mg (has no administration in time range)   propranolol ER (INDERAL LA) 24 hr capsule 60 mg (has no administration in time range)   albuterol (PROVENTIL HFA/VENTOLIN HFA) inhaler (has no administration in time range)   QUEtiapine (SEROquel) tablet 100 mg (has no administration in time range)        Assessments & Plan (with Medical Decision Making)   Patient is here for a mental health evaluation. He has history of bipolar disorder and currently has breakthrough mary. He has been sleeping poorly and is labile and gets easily emotionally dysregulated. He was seen last week and placed on a 72 hour hold, but cleared in his mental state after a few days of waiting in the ED for a bed. He had been using drugs at the time that contributed to his decompensated state, and improved when the drug was metabolized out of his system. Patient has had problems regulating his mood while at home. He gets easily irritable and labile, causing family to be concerned about his stability and wanting him to come into the hospital for stabilization. Patient is presently voluntary for admission.     Patient and mother was told that there are at least 20 people ahead of him waiting for an inpatient bed and he may  be spending days in the ED. We will continue to monitor him and provide him with his prescribed meds. He is open to taking a higher dose of Seroquel 100 mg rather than 25 mg. If patient changes his mind about admission, he can be re-evaluated and likely discharged. Mother requests a call to let her know if it is the case so she can arrange for transportation.    I have reviewed the nursing notes. I have reviewed the findings, diagnosis, plan and need for follow up with the patient.    New Prescriptions    No medications on file       Final diagnoses:   Bipolar affective disorder, currently manic, moderate (H)   Chemical dependency (H)       --  Natalio Dallas MD  [facility] EMERGENCY DEPARTMENT  5/16/2022     Natalio Dallas MD  05/16/22 2227
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
## 2022-05-17 NOTE — ED NOTES
"He was calling staff \"Niger\" telling them to go back where they came from. He remained in his room while staff was in the goyal with another 1-1. When the 1-1 was released he came up to his doorway. He was told to stay in his room.   "

## 2022-05-18 LAB
AMPHETAMINES UR QL SCN: ABNORMAL
BARBITURATES UR QL: ABNORMAL
BENZODIAZ UR QL: ABNORMAL
CANNABINOIDS UR QL SCN: ABNORMAL
COCAINE UR QL: ABNORMAL
OPIATES UR QL SCN: ABNORMAL

## 2022-05-18 PROCEDURE — 80307 DRUG TEST PRSMV CHEM ANLYZR: CPT | Performed by: PSYCHIATRY & NEUROLOGY

## 2022-05-18 PROCEDURE — 250N000013 HC RX MED GY IP 250 OP 250 PS 637: Performed by: PSYCHIATRY & NEUROLOGY

## 2022-05-18 RX ADMIN — LITHIUM CARBONATE 600 MG: 300 TABLET, EXTENDED RELEASE ORAL at 11:00

## 2022-05-18 RX ADMIN — LAMOTRIGINE 25 MG: 25 TABLET ORAL at 11:01

## 2022-05-18 RX ADMIN — PROPRANOLOL HYDROCHLORIDE 60 MG: 60 CAPSULE, EXTENDED RELEASE ORAL at 11:01
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
## 2022-05-18 NOTE — ED NOTES
[facility] ED Mental Health Handoff Note:       Brief HPI:  This is a 32 year old male signed out to me by Dr. Erickson.  See initial ED Provider note for full details of the presentation. Interval history is pertinent for manic bipolar and substance abuse plan for admit 72hr hold..    Home meds reviewed and ordered/administered: Yes    Medically stable for inpatient mental health admission: Yes.    Evaluated by mental health: Yes. The recommendation is for inpatient mental health treatment. Bed search in process    Safety concerns: At the time I received sign out, there were no safety concerns.    Hold Status:  Active Orders   Legal    Emergency Hospitalization Hold (72 Hr Hold)     Frequency: Effective Now     Start Date/Time: 05/17/22 1501      Number of Occurrences: Until Specified    Health Officer Authority to Detain (BASILIO)     Frequency: Effective Now     Start Date/Time: 05/16/22 2117      Number of Occurrences: Until Specified     Order Comments: Pt. unable to keep self safe.              Exam:   Patient Vitals for the past 24 hrs:   BP Temp Temp src Pulse Resp SpO2   05/17/22 1825 (!) 130/96 97.7  F (36.5  C) Oral 101 14 95 %   05/17/22 0850 121/78 -- -- 77 16 99 %             ED Course:    Medications   lithium ER (LITHOBID) CR tablet 600 mg (600 mg Oral Not Given 5/17/22 0925)   propranolol ER (INDERAL LA) 24 hr capsule 60 mg (60 mg Oral Not Given 5/17/22 0925)   albuterol (PROVENTIL HFA/VENTOLIN HFA) inhaler (has no administration in time range)   QUEtiapine (SEROquel) tablet 100 mg (100 mg Oral Given 5/17/22 2137)   lithium ER (LITHOBID) CR tablet 600 mg (600 mg Oral Given 5/17/22 2136)   lamoTRIgine (LaMICtal) tablet 25 mg (has no administration in time range)   OLANZapine (zyPREXA) injection 10 mg (10 mg Intramuscular Given 5/17/22 0210)   OLANZapine (zyPREXA) injection 10 mg (10 mg Intramuscular Given 5/17/22 1331)            There were no significant events during my shift.    Patient was  signed out to the oncoming provider, Dr. Harris      Impression:    ICD-10-CM    1. Bipolar affective disorder, currently manic, moderate (H)  F31.12    2. Chemical dependency (H)  F19.20        Plan:    1. Awaiting inpatient mental health admission/transfer.      RESULTS:   Results for orders placed or performed during the hospital encounter of 05/16/22 (from the past 24 hour(s))   Asymptomatic COVID-19 Virus (Coronavirus) by PCR Nasopharyngeal     Status: Normal    Collection Time: 05/17/22  9:53 PM    Specimen: Nasopharyngeal; Swab   Result Value Ref Range    SARS CoV2 PCR Negative Negative    Narrative    Testing was performed using the geovany  SARS-CoV-2 & Influenza A/B Assay on the geovany  Rosemary  System.  This test should be ordered for the detection of SARS-COV-2 in individuals who meet SARS-CoV-2 clinical and/or epidemiological criteria. Test performance is unknown in asymptomatic patients.  This test is for in vitro diagnostic use under the FDA EUA for laboratories certified under CLIA to perform moderate and/or high complexity testing. This test has not been FDA cleared or approved.  A negative test does not rule out the presence of PCR inhibitors in the specimen or target RNA in concentration below the limit of detection for the assay. The possibility of a false negative should be considered if the patient's recent exposure or clinical presentation suggests COVID-19.  [facility] are certified under the Clinical Laboratory Improvement Amendments of 1988 (CLIA-88) as qualified to perform moderate and/or high complexity laboratory testing.   CBC with platelets differential     Status: None    Collection Time: 05/17/22  9:53 PM    Narrative    The following orders were created for panel order CBC with platelets differential.  Procedure                               Abnormality         Status                     ---------                               -----------         ------                      CBC with platelets and d...[[id number]]                      Final result                 Please view results for these tests on the individual orders.   Comprehensive metabolic panel     Status: Abnormal    Collection Time: 05/17/22  9:53 PM   Result Value Ref Range    Sodium 142 133 - 144 mmol/L    Potassium 3.7 3.4 - 5.3 mmol/L    Chloride 108 94 - 109 mmol/L    Carbon Dioxide (CO2) 29 20 - 32 mmol/L    Anion Gap 5 3 - 14 mmol/L    Urea Nitrogen 12 7 - 30 mg/dL    Creatinine 0.93 0.66 - 1.25 mg/dL    Calcium 9.6 8.5 - 10.1 mg/dL    Glucose 122 (H) 70 - 99 mg/dL    Alkaline Phosphatase 47 40 - 150 U/L    AST 15 0 - 45 U/L    ALT 29 0 - 70 U/L    Protein Total 6.9 6.8 - 8.8 g/dL    Albumin 4.1 3.4 - 5.0 g/dL    Bilirubin Total 0.7 0.2 - 1.3 mg/dL    GFR Estimate >90 >60 mL/min/1.73m2   TSH with free T4 reflex     Status: Normal    Collection Time: 05/17/22  9:53 PM   Result Value Ref Range    TSH 0.61 0.40 - 4.00 mU/L   Lithium level     Status: Normal    Collection Time: 05/17/22  9:53 PM   Result Value Ref Range    Lithium <0.2   mmol/L   CBC with platelets and differential     Status: None    Collection Time: 05/17/22  9:53 PM   Result Value Ref Range    WBC Count 5.5 4.0 - 11.0 10e3/uL    RBC Count 4.88 4.40 - 5.90 10e6/uL    Hemoglobin 14.9 13.3 - 17.7 g/dL    Hematocrit 44.3 40.0 - 53.0 %    MCV 91 78 - 100 fL    MCH 30.5 26.5 - 33.0 pg    MCHC 33.6 31.5 - 36.5 g/dL    RDW 12.4 10.0 - 15.0 %    Platelet Count 215 150 - 450 10e3/uL    % Neutrophils 73 %    % Lymphocytes 19 %    % Monocytes 7 %    % Eosinophils 1 %    % Basophils 0 %    % Immature Granulocytes 0 %    NRBCs per 100 WBC 0 <1 /100    Absolute Neutrophils 4.0 1.6 - 8.3 10e3/uL    Absolute Lymphocytes 1.0 0.8 - 5.3 10e3/uL    Absolute Monocytes 0.4 0.0 - 1.3 10e3/uL    Absolute Eosinophils 0.0 0.0 - 0.7 10e3/uL    Absolute Basophils 0.0 0.0 - 0.2 10e3/uL    Absolute Immature Granulocytes 0.0 <=0.4 10e3/uL    Absolute NRBCs 0.0 10e3/uL              MD Serene De Jesus, Edison Alfredo MD  05/18/22 6783

## 2022-05-18 NOTE — TELEPHONE ENCOUNTER
R:  No appropriate beds available within the [facility]. Bed search update @ 03:52:    [facility]: @ cap per website  [facility]:@ cap per website  [facility]: @ cap per website  [facility]: @ cap per website  [facility]: @ cap per website  [facility]: @ cap per website  [facility]: @ cap per website  [facility]: Posting 1 bed. Mixed unit. Low acuity. Per Alberto @ 00:59 they are reviewing beyond their capacity - intake can call again later to see if beds are available. Pt meets exclusionary criteria  [facility]: @ cap per website  [facility]: @ cap per website  [facility] @ cap per website  [facility] posting 1 bed for very low acuity only. Pt not appropriate for current bed avail  [facility] @ cap per website  [facility]: @ cap per website  [facility]: @ cap per website   [facility]: @ cap per website  [facility]: Posting 5 beds. Must have the cognitive ability to do programming. No aggressive or violent behavior or recent HX in the last 2 yrs. MH must be primary. Pt meets exclusionary criteria   [facility]: @ cap per website  [facility]: Posting 1 [facility]. Low acuity, Neg Covid. Called multiple times @ 03:12 but call was unable to go through. Pt not appropriate for current bed avail  [facility]: @ cap per website  [facility]: Posting 8 beds. Per Eri @ 03:29, they require a resulted urine drug screen prior to review process as presentation may be related to substance use. UDS not collected at this time.   [facility]: Posting 2 beds. Mixed unit. Per Shandra @ 01:02 they do not have an appropriate bed available (only taking very low acuity)    Called ED @ 03:49 re: collection of urine drug screen    Pt remains on work list until appropriate placement is available

## 2022-05-19 ENCOUNTER — TELEPHONE (OUTPATIENT)
Dept: BEHAVIORAL HEALTH | Facility: CLINIC | Age: 32
End: 2022-05-19
Payer: COMMERCIAL

## 2022-05-19 PROCEDURE — 250N000013 HC RX MED GY IP 250 OP 250 PS 637: Performed by: PSYCHIATRY & NEUROLOGY

## 2022-05-19 RX ADMIN — PROPRANOLOL HYDROCHLORIDE 60 MG: 60 CAPSULE, EXTENDED RELEASE ORAL at 11:22

## 2022-05-19 RX ADMIN — QUETIAPINE 100 MG: 100 TABLET ORAL at 22:05

## 2022-05-19 RX ADMIN — QUETIAPINE 100 MG: 100 TABLET ORAL at 00:46

## 2022-05-19 RX ADMIN — LITHIUM CARBONATE 600 MG: 300 TABLET, EXTENDED RELEASE ORAL at 00:46

## 2022-05-19 RX ADMIN — LITHIUM CARBONATE 600 MG: 300 TABLET, EXTENDED RELEASE ORAL at 11:22

## 2022-05-19 RX ADMIN — LITHIUM CARBONATE 600 MG: 300 TABLET, EXTENDED RELEASE ORAL at 22:05

## 2022-05-19 RX ADMIN — LAMOTRIGINE 25 MG: 25 TABLET ORAL at 11:21

## 2022-05-19 NOTE — ED PROVIDER NOTES
"[facility] ED Mental Health Handoff Note:       Brief HPI:  This is a 32 year old male signed out to me by Dr. Calderon.  See initial ED Provider note for full details of the presentation. Interval history is pertinent for no acute changes.    Home meds reviewed and ordered/administered: Yes    Medically stable for inpatient mental health admission: Yes.    Evaluated by mental health: Yes. The recommendation is for inpatient mental health treatment. Bed search in process    Safety concerns: At the time I received sign out, there were no safety concerns.    Hold Status:  Active Orders   Legal    Emergency Hospitalization Hold (72 Hr Hold)     Frequency: Effective Now     Start Date/Time: 05/17/22 1501      Number of Occurrences: Until Specified    Health Officer Authority to Detain (BASILIO)     Frequency: Effective Now     Start Date/Time: 05/16/22 2117      Number of Occurrences: Until Specified     Order Comments: Pt. unable to keep self safe.             Exam:   Patient Vitals for the past 24 hrs:   BP Temp Temp src Pulse Resp SpO2 Height Weight   05/19/22 1100 (!) 140/97 97.4  F (36.3  C) Oral 60 14 98 % -- --   05/18/22 1944 124/85 98.3  F (36.8  C) Oral 71 16 96 % 1.803 m (5' 11\") 79.4 kg (175 lb)           ED Course:    Medications   lithium ER (LITHOBID) CR tablet 600 mg (600 mg Oral Given 5/19/22 1122)   propranolol ER (INDERAL LA) 24 hr capsule 60 mg (60 mg Oral Given 5/19/22 1122)   albuterol (PROVENTIL HFA/VENTOLIN HFA) inhaler (has no administration in time range)   QUEtiapine (SEROquel) tablet 100 mg (100 mg Oral Given 5/19/22 0046)   lithium ER (LITHOBID) CR tablet 600 mg (600 mg Oral Given 5/19/22 0046)   lamoTRIgine (LaMICtal) tablet 25 mg (25 mg Oral Given 5/19/22 1121)   OLANZapine (zyPREXA) injection 10 mg (10 mg Intramuscular Given 5/17/22 0210)   OLANZapine (zyPREXA) injection 10 mg (10 mg Intramuscular Given 5/17/22 1331)            There were no significant events during my shift.    Patient " was signed out to the oncoming provider.       Impression:    ICD-10-CM    1. Bipolar affective disorder, currently manic, moderate (H)  F31.12    2. Chemical dependency (H)  F19.20        Plan:    1. Awaiting inpatient mental health admission/transfer.      RESULTS:   No results found for this visit on 05/16/22 (from the past 24 hour(s)).          MD Brent Rodriguez Cara, MD  05/19/22 4618

## 2022-05-19 NOTE — ED NOTES
"[facility] ED Mental Health Handoff Note:       Brief HPI:  This is a 32 year old male signed out to me by Dr. Erickson.  See initial ED Provider note for full details of the presentation. H/o bipolar Interval history is pertinent for aggressiveness.    Home meds reviewed and ordered/administered: Yes    Medically stable for inpatient mental health admission: Yes.    Evaluated by mental health: Yes. The recommendation is for inpatient mental health treatment. Bed search in process    Safety concerns: At the time I received sign out, there were no safety concerns.    Hold Status:  Active Orders   Legal    Emergency Hospitalization Hold (72 Hr Hold)     Frequency: Effective Now     Start Date/Time: 05/17/22 1501      Number of Occurrences: Until Specified    Health Officer Authority to Detain (BASILIO)     Frequency: Effective Now     Start Date/Time: 05/16/22 2117      Number of Occurrences: Until Specified     Order Comments: Pt. unable to keep self safe.              Exam:   Patient Vitals for the past 24 hrs:   BP Temp Temp src Pulse Resp SpO2 Height Weight   05/19/22 1100 (!) 140/97 97.4  F (36.3  C) Oral 60 14 98 % -- --   05/18/22 1944 124/85 98.3  F (36.8  C) Oral 71 16 96 % 1.803 m (5' 11\") 79.4 kg (175 lb)           ED Course:    Medications   lithium ER (LITHOBID) CR tablet 600 mg (600 mg Oral Given 5/19/22 1122)   propranolol ER (INDERAL LA) 24 hr capsule 60 mg (60 mg Oral Given 5/19/22 1122)   albuterol (PROVENTIL HFA/VENTOLIN HFA) inhaler (has no administration in time range)   QUEtiapine (SEROquel) tablet 100 mg (100 mg Oral Given 5/19/22 0046)   lithium ER (LITHOBID) CR tablet 600 mg (600 mg Oral Given 5/19/22 0046)   lamoTRIgine (LaMICtal) tablet 25 mg (25 mg Oral Given 5/19/22 1121)   OLANZapine (zyPREXA) injection 10 mg (10 mg Intramuscular Given 5/17/22 0210)   OLANZapine (zyPREXA) injection 10 mg (10 mg Intramuscular Given 5/17/22 1331)            There were no significant events during my " shift.    Patient was signed out to the oncoming provider,        Impression:    ICD-10-CM    1. Bipolar affective disorder, currently manic, moderate (H)  F31.12    2. Chemical dependency (H)  F19.20        Plan:    1. Awaiting inpatient mental health admission/transfer.      RESULTS:   No results found for this visit on 05/16/22 (from the past 24 hour(s)).          Nigel Calderon MD, Nigel Melara MD  05/19/22 4485

## 2022-05-19 NOTE — PROGRESS NOTES
Civil Commitment Status    Current commitment status is: under 72 Hour Hold expiring 5/20 at 1501.    County involved: [facility] - Pre petition (P) [phone]  (F) [phone]    Petition for civil commitment sent to [facility] and verbal report given to [facility] with pre petition on 5/19/2022 at 1130am. Pre petition screener will be Amie Torres ([phone]).. [facility] is aware patient is boarding in the emergency room until a psychiatric bed is available. Contact information to emergency room provided to [facility].    Writer heard from Amie Torres and she will call the emergency room today and interview patient.    Writer sent documents to [facility] by secure email.    Next steps include:  -Patient will be screened by pre petition to determine if they support petition for civil commitment.   -[place] will need to be completed by Attending once patient is on a unit, [facility] is aware that will come at a later time.   -Copies of paperwork/Exhibit A should be sent to scanning and copies placed in patient physical chart.      Shital Chung, TIMOTHYC, LADC  Licensed Psychotherapist  [facility]  Direct: [phone]    .

## 2022-05-19 NOTE — TELEPHONE ENCOUNTER
R:  No appropriate beds within [facility]. Bed search update @ 03:01:    [facility]: @ cap per website  [facility]:@ cap per website  [facility]: @ cap per website  [facility]: @ cap per website  [facility]: @ cap per website  [facility]: @ cap per website  [facility]: @ cap per website  [facility]: Posting 1 bed. Low acuity/Mixed unit. Per Joyce @ 02:29 she is reviewing to cap but will call intake back if bed is still available. Pt meets exclusionary criteria  [facility]: @ cap per website  [facility]: @ cap per website  [facility]: @ cap per website  [facility]: @ cap per website  [facility]: @ cap per website  [facility]: @ cap per website   [facility]: @ cap per website  [facility]: @ cap per website  [facility]: Posting 4 beds. Must have the cognitive ability to do programming. No aggressive or violent behavior or recent HX in the last 2 yrs. MH must be primary. Low acuity only.  Pt meets exclusionary criteria  [facility]: @ cap per website  [facility]: Posting 1 bed. Low acuity, Neg Covid. Called x 2 @ 02:23 and the line was busy. Pt not appropriate for current bed avail  [facility]: @ cap per website  [facility]: Posting 8 beds. Per Eri @ 02:20 they have no adult beds available  [facility]: @ cap per website    Pt remains on work list until appropriate placement is available

## 2022-05-19 NOTE — TELEPHONE ENCOUNTER
R: The pt is currently in the [facility] awaiting placement.     Intake Morning Bed Search (Done at 8:45a):  [facility] is posting 0 beds.   [facility] is posting 0 beds.  [facility] is posting 0 beds.  [facility] is posting 0 beds.  [facility] is posting 0 beds.  [facility] is posting 0 beds.  [facility] is posting 0 beds.  [facility] is posting 0 beds.  [facility] is posting 1 bed. Mixed unit 12+. Low acuity only. Pt does not fit open bed available.  [facility] is positing 0 beds. No aggression.   [facility] is posting 0 beds.   [facility] is posting 0 beds. Low acuity only.  [facility] is posting 0 beds. Low acuity only.   [facility] is posting 0 beds. Low acuity.   [facility] is posting 0 beds. Low acuity only. 72 hr hold required.   [facility] is posting 0 beds. Call for details.  [facility] is posting 0 beds. Vol only, No Hx of aggression, violence, or assault. No sexual offenders. No 72 hr holds.    [facility] is posting 4 beds. (Must have the cognitive ability to do programming. No aggressive or violent behavior or recent HX in the last 2 yrs. MH must be primary). Pt does not fit open bed available.  [facility] is posting 0 beds. Low acuity only. Violence and aggression capped.   Counts include 234 beds at the [facility] is posting 1 bed. Low acuity, Neg Covid. Pt does not fit open bed available.  [facility] is posting 0 beds. Covid neg. Vol only. Combined adolescent and adult unit. No aggressive or violent behavior. No registered sex offenders.   [facility] is posting 8 beds. Call for details.  [facility] is posting 2 beds.       8:23a Intake called [facility] reviewing for pending discharges. Intake faxed clinical for review. Intake awaiting response.    1:37p Intake received update from  [facility]- pt is declined due to not meeting facility  acuity requirements.     2:18p [facility] at capacity. The pt remains on the waitlist. Intake continues to identify appropriate bed placement.

## 2022-05-20 PROBLEM — F31.12 BIPOLAR AFFECTIVE DISORDER, CURRENTLY MANIC, MODERATE (H): Status: ACTIVE | Noted: 2022-05-20

## 2022-05-20 PROBLEM — F19.20 CHEMICAL DEPENDENCY (H): Status: ACTIVE | Noted: 2022-05-20

## 2022-05-20 PROCEDURE — 124N000002 HC R&B MH UMMC

## 2022-05-20 PROCEDURE — 94640 AIRWAY INHALATION TREATMENT: CPT | Performed by: PSYCHIATRY & NEUROLOGY

## 2022-05-20 PROCEDURE — 250N000013 HC RX MED GY IP 250 OP 250 PS 637: Performed by: EMERGENCY MEDICINE

## 2022-05-20 PROCEDURE — G0177 OPPS/PHP; TRAIN & EDUC SERV: HCPCS

## 2022-05-20 PROCEDURE — 250N000013 HC RX MED GY IP 250 OP 250 PS 637: Performed by: PSYCHIATRY & NEUROLOGY

## 2022-05-20 PROCEDURE — 99223 1ST HOSP IP/OBS HIGH 75: CPT | Mod: AI | Performed by: NURSE PRACTITIONER

## 2022-05-20 RX ORDER — ONDANSETRON 4 MG/1
4 TABLET, ORALLY DISINTEGRATING ORAL EVERY 6 HOURS PRN
Status: DISCONTINUED | OUTPATIENT
Start: 2022-05-20 | End: 2022-05-23 | Stop reason: HOSPADM

## 2022-05-20 RX ORDER — TRAZODONE HYDROCHLORIDE 50 MG/1
50 TABLET, FILM COATED ORAL AT BEDTIME
Status: DISCONTINUED | OUTPATIENT
Start: 2022-05-20 | End: 2022-05-20

## 2022-05-20 RX ORDER — HYDROXYZINE HYDROCHLORIDE 25 MG/1
25-50 TABLET, FILM COATED ORAL EVERY 4 HOURS PRN
Status: DISCONTINUED | OUTPATIENT
Start: 2022-05-20 | End: 2022-05-23 | Stop reason: HOSPADM

## 2022-05-20 RX ORDER — TRAZODONE HYDROCHLORIDE 50 MG/1
50 TABLET, FILM COATED ORAL
Status: DISCONTINUED | OUTPATIENT
Start: 2022-05-20 | End: 2022-05-23 | Stop reason: HOSPADM

## 2022-05-20 RX ORDER — OLANZAPINE 10 MG/2ML
10 INJECTION, POWDER, FOR SOLUTION INTRAMUSCULAR 2 TIMES DAILY PRN
Status: DISCONTINUED | OUTPATIENT
Start: 2022-05-20 | End: 2022-05-23 | Stop reason: HOSPADM

## 2022-05-20 RX ORDER — ALBUTEROL SULFATE 90 UG/1
2 AEROSOL, METERED RESPIRATORY (INHALATION) ONCE
Status: COMPLETED | OUTPATIENT
Start: 2022-05-20 | End: 2022-05-20

## 2022-05-20 RX ORDER — AMOXICILLIN 250 MG
1 CAPSULE ORAL 2 TIMES DAILY PRN
Status: DISCONTINUED | OUTPATIENT
Start: 2022-05-20 | End: 2022-05-23 | Stop reason: HOSPADM

## 2022-05-20 RX ORDER — ACETAMINOPHEN 325 MG/1
325 TABLET ORAL EVERY 4 HOURS PRN
Status: DISCONTINUED | OUTPATIENT
Start: 2022-05-20 | End: 2022-05-23 | Stop reason: HOSPADM

## 2022-05-20 RX ORDER — ALBUTEROL SULFATE 90 UG/1
2 AEROSOL, METERED RESPIRATORY (INHALATION) EVERY 6 HOURS PRN
Status: DISCONTINUED | OUTPATIENT
Start: 2022-05-20 | End: 2022-05-20

## 2022-05-20 RX ORDER — OLANZAPINE 10 MG/1
10 TABLET, ORALLY DISINTEGRATING ORAL 2 TIMES DAILY PRN
Status: DISCONTINUED | OUTPATIENT
Start: 2022-05-20 | End: 2022-05-23 | Stop reason: HOSPADM

## 2022-05-20 RX ORDER — LOPERAMIDE HCL 2 MG
2 CAPSULE ORAL 4 TIMES DAILY PRN
Status: DISCONTINUED | OUTPATIENT
Start: 2022-05-20 | End: 2022-05-23 | Stop reason: HOSPADM

## 2022-05-20 RX ORDER — MAGNESIUM HYDROXIDE/ALUMINUM HYDROXICE/SIMETHICONE 120; 1200; 1200 MG/30ML; MG/30ML; MG/30ML
30 SUSPENSION ORAL EVERY 4 HOURS PRN
Status: DISCONTINUED | OUTPATIENT
Start: 2022-05-20 | End: 2022-05-23 | Stop reason: HOSPADM

## 2022-05-20 RX ADMIN — ALBUTEROL SULFATE 2 PUFF: 90 AEROSOL, METERED RESPIRATORY (INHALATION) at 01:46

## 2022-05-20 RX ADMIN — QUETIAPINE 100 MG: 100 TABLET ORAL at 21:47

## 2022-05-20 RX ADMIN — LITHIUM CARBONATE 600 MG: 300 TABLET, EXTENDED RELEASE ORAL at 21:47

## 2022-05-20 RX ADMIN — LAMOTRIGINE 25 MG: 25 TABLET ORAL at 09:00

## 2022-05-20 RX ADMIN — PROPRANOLOL HYDROCHLORIDE 60 MG: 60 CAPSULE, EXTENDED RELEASE ORAL at 09:00

## 2022-05-20 RX ADMIN — LITHIUM CARBONATE 600 MG: 300 TABLET, EXTENDED RELEASE ORAL at 09:00

## 2022-05-20 ASSESSMENT — ACTIVITIES OF DAILY LIVING (ADL)
FALL_HISTORY_WITHIN_LAST_SIX_MONTHS: NO
DIFFICULTY_COMMUNICATING: NO
VISION_MANAGEMENT: PRESCRIPTION GLASSES
CHANGE_IN_FUNCTIONAL_STATUS_SINCE_ONSET_OF_CURRENT_ILLNESS/INJURY: NO
DOING_ERRANDS_INDEPENDENTLY_DIFFICULTY: NO
WALKING_OR_CLIMBING_STAIRS_DIFFICULTY: NO
CONCENTRATING,_REMEMBERING_OR_MAKING_DECISIONS_DIFFICULTY: YES
WEAR_GLASSES_OR_BLIND: YES
TOILETING_ISSUES: NO
DRESSING/BATHING_DIFFICULTY: NO
DIFFICULTY_EATING/SWALLOWING: NO
HEARING_DIFFICULTY_OR_DEAF: NO

## 2022-05-20 NOTE — PLAN OF CARE
INITIAL OT PROGRESS NOTES:         05/20/22 1149   Group Therapy Session   Group Attendance attended group session   Time Session Began 0915   Time Session Ended 1100   Total Time patient participated (minutes) 90   Total # Attendees 3 in each group   Group Type expressive therapy   Group Topic Covered emotions/expression;cognitive activities   Group Session Detail Crafts as a tool for assessment of functional performance, a means to elicit use of executive skills and to increase awareness of positive coping tools.   Patient Response/Contribution cooperative with task;listened actively;organized;requested more information about topic   Patient Response Detail Initiated group, was given and completed a written self assessment. Identified experiencing sx's for a couple months. Noted experiencing: anxiety, anger, feelings of abandonment, cognitive deficits, AH, nightmares, relationship issues, increased chemical use and money concerns. Personal coping skills include: music, talking to someone, sports, exercise and T.V.Goal selected was find resources and support. Current supports include: friends, family, therapist and psychiatrist.  OT purpose was explained with a value of having involvement in tx plan, and provided options to meet self identified goals. Will assess further in the areas of organization, problem solving, and concentration.  Actively participated with structure and support. Initially decided on difficult task but, quickly realized he was unable to problem solve and plan complex task. Social with peer. Was attentive to demonstration and helpful hints from peer. Good results/attention to details and planning. Will continue to encourage and support group attendance and participation for increased self awareness and exploration of healthy self management.

## 2022-05-20 NOTE — PLAN OF CARE
Assessment/Intervention/Current Symtoms and Care Coordination:  Petition for commitment was made while pt was in the ED; petition documents were emailed to [facility].   [facility] emailed [facility], advised pt is on station 32 and gave contact information.    Call from Amie johnson, [phone]. She spoke to pt who is willing to stay voluntarily for another week or so until stable. She spoke with pt mom who is agreeing with that plan.     [facility] left message with Amie. Advised that the provider has met with pt and the team has discussed his situation. Agree to have pt sign in voluntarily and withdraw the petition.    [facility] met with pt who is interested in returning to home and work, attending outpatient treatment.     Discharge Plan or Goal:  Pending stabilization & development of a safe discharge plan.  Considerations include: unclear at this time       Barriers to Discharge:  Patient requires further psychiatric stabilization due to current symptomology and Medication management with possible adjustments       Referral Status:  Petition for commitment was made while pt was in the ED     Legal Status:  voluntary    Contacts:  Mom: Negar at [phone]  PCP: Belen Tolentino at [facility]  Psychiatry: Marguerite Reaves at [facility]      Upcoming Meetings/Important Dates:  None

## 2022-05-20 NOTE — TELEPHONE ENCOUNTER
R: 32 / Pascale / Mike  Per notes, petition for civil commitment sent to [facility] 5/19/22.    23:54 - Paged resident for review. 00:13 - Resident called intake and declines pt for station 20 at this time d/t pt being too acute for their milieu, which has high acuity. 01:20 - Discussed w/ Dr. Gay ([place]) and he accepts for mental health admission. 01:23 - Called unit. RN will call intake back. 01:31 - Unit RN called back and will call ED for report in 10 minutes. 01:32 - Notified ED.

## 2022-05-20 NOTE — DISCHARGE INSTRUCTIONS
Behavioral Discharge Planning and Instructions      Summary:  You were admitted on 5/16/2022  due to  agitation and Manic Symptomology.  You were treated by Lea Ashraf NP and discharged on 05/23/2022 from Station station 32 to Home  A petition for commitment was submitted while you were in the emergency department. You subsequently agreed to sign in voluntarily and stay for stabilization. The petition was withdrawn.    Primary Diagnoses:   Bipolar disorder type 1  Cannabis abuse  Polysubstance abuse    Mental Health Follow-Up:  Psychiatry Appointment-  You stated that you will schedule your follow up appointments independently.   Dada Castillo PA-C - [facility]     Therapy Appointment - You stated that you will schedule your follow up appointments independently.   Ashley Nissen, MA, [facility]    You are interested in chemical dependency outpatient treatment. We recommend a treatment program that has awareness of mental health issues. You will need to contact your insurance to determine coverage.     The following programs are in your area:  [facility] treatment program: [phone]  [facility]: [phone]   [facility] programs at [phone]  [facility] [phone]    Attend all scheduled appointments with your outpatient providers. Call at least 24 hours in advance if you need to reschedule an appointment to ensure continued access to your outpatient providers.     Major Treatments, Procedures and Findings:  You were provided with: a psychiatric assessment, medication evaluation and/or management, group therapy and milieu management    Symptoms to Report: feeling more aggressive, increased confusion, losing more sleep, mood getting worse or thoughts of suicide    Early warning signs can include: increased depression or anxiety sleep disturbances increased thoughts or behaviors of suicide or self-harm  increased unusual thinking, such as  "paranoia or hearing voices    Safety and Wellness:  Take all medicines as directed.  Make no changes unless your doctor suggests them.      Follow treatment recommendations.  Refrain from alcohol and non-prescribed drugs.  Ask your support system to help you reduce your access to items that could harm yourself or others. Items could include:  Firearms  Medicines (both prescribed and over-the-counter)  Knives and other sharp objects  Ropes and like materials  Car keys  If there is a concern for safety, call 911. If there is a concern for safety, call 911.      Resources:   COPE:[phone]  Anette Logan: [phone]     Crisis Intervention: 658.270.1452 or 416-615-9123 (TTY: 930.455.8128).  Call anytime for help.  National [place] on Mental Illness (www.[place].david.org): 634.363.8800 or 606-973-7544.  Alcoholics Anonymous (www.alcoholics-anonymous.org): Check your phone book for your local chapter.  Suicide Awareness Voices of Education (SAVE) (www.save.org): 750-575-RDDG (9315)  National Suicide Prevention Line (www.mentalhealthmn.org): 177-431-HLYM (5364)  Mental Health Consumer/Survivor Network of [place] (www.mhcsn.net): 275.683.3568 or 730-651-3152  Mental Health Association of [place] (www.mentalhealth.org): 635.666.5950 or 112-425-0841  Self- Management and Recovery Training., [employer]-- Toll free: 464.590.4265  www.Executive Caddie.Happy Days - A New Musical  Text 4 Life: txt \"LIFE\" to 05060 for immediate support and crisis intervention  Crisis text line: Text \"[place]\" to 206148. Free, confidential, 24/7.    The treatment team has appreciated the opportunity to work with you. If you have any questions or concerns our unit number is [phone]       "
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
## 2022-05-20 NOTE — PLAN OF CARE
"Patient slept until the breakfast cart arrived and ate 100% of meals this shift.  He was minimally interactive with peers but appropriate and did socialize appropriately with staff and other patients.  This shift he has been pleasant and cooperative.  Watched TV in the lounge, attended groups and rested in his room intermittently.  He is not happy about being in the hospital but this is his only complaint and he verbalizes understanding with the process.  He did sign in voluntarily this shift and did not mention wanting to discharge - notes indicate a hold would be considered if he does request to leave.  Denies SI, SIBI, homicidal ideation and reports he does not remember having these thoughts prior to admission.  BP (!) 142/103 (BP Location: Left arm, Patient Position: Sitting, Cuff Size: Adult Regular)   Pulse 72   Temp 97.9  F (36.6  C) (Oral)   Resp 16   Ht 1.803 m (5' 11\")   Wt 79.4 kg (175 lb)   SpO2 100%   BMI 24.41 kg/m      "
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
## 2022-05-20 NOTE — PLAN OF CARE
05/20/22 1125   Individualization/Patient Specific Goals   Patient Personal Strengths family/social support   Patient Vulnerabilities lacks insight into illness   Anxieties, Fears or Concerns Pt requires stabilization regarding mary   Individualized Care Needs Pt to work with the team and provider for stabilization   Patient-Specific Goals (Include Timeframe) Pt has started having some improvement   Interprofessional Rounds   Summary Pt newly admitted, manic, using pot regularly. Petition for commitment started in the ED. Pt now willing to sign in voluntarily so petition withdrawn.   Participants advanced practice nurse;nursing;[facility]   Team Discussion   Participants Lea Ashraf, NP; Heraclio RN; Luana Kearney, MELINDA   Progress Pt newly admitted, has signed in voluntarily and the petition has been withdrawn   Anticipated length of stay 1 week   Continued Stay Criteria/Rationale pt is manic, requires stabilization, had threatened others   Medical/Physical none   Plan pt to work with team and provider for stabilization   Rationale for change in precautions or plan newly admitted   Safety Plan standard unit protocol   Anticipated Discharge Disposition home or self-care   PRECAUTIONS AND SAFETY    Behavioral Orders   Procedures    Assault precautions    Code 1 - Restrict to Unit    Elopement precautions    Routine Programming     As clinically indicated    Status 15     Every 15 minutes.       Safety  Safety WDL: WDL

## 2022-05-20 NOTE — PLAN OF CARE
OT PROGRESS NOTE:       05/20/22 1350   Group Therapy Session   Group Attendance attended group session   Time Session Began 1300   Time Session Ended 1345   Total Time patient participated (minutes) 45   Total # Attendees 3   Group Type life skill   Group Topic Covered balanced lifestyle;self-care activities   Group Session Detail Experiential opportunity practicing letting go of stress/relaxation for improved self management.   Patient Response/Contribution able to recall/repeat info presented;cooperative with task;discussed personal experience with topic;expressed understanding of topic   Patient Response Detail Participated in experiential exercices supporting health and wellness through relaxation and letting go of stress. Noted benefit of the exercise and ways he will or can implement in daily living.

## 2022-05-21 PROCEDURE — H2032 ACTIVITY THERAPY, PER 15 MIN: HCPCS

## 2022-05-21 PROCEDURE — 124N000002 HC R&B MH UMMC

## 2022-05-21 PROCEDURE — 250N000013 HC RX MED GY IP 250 OP 250 PS 637: Performed by: PSYCHIATRY & NEUROLOGY

## 2022-05-21 RX ADMIN — PROPRANOLOL HYDROCHLORIDE 60 MG: 60 CAPSULE, EXTENDED RELEASE ORAL at 08:47

## 2022-05-21 RX ADMIN — LITHIUM CARBONATE 600 MG: 300 TABLET, EXTENDED RELEASE ORAL at 21:48

## 2022-05-21 RX ADMIN — LITHIUM CARBONATE 600 MG: 300 TABLET, EXTENDED RELEASE ORAL at 08:46

## 2022-05-21 RX ADMIN — LAMOTRIGINE 25 MG: 25 TABLET ORAL at 08:47

## 2022-05-21 RX ADMIN — QUETIAPINE 100 MG: 100 TABLET ORAL at 21:49

## 2022-05-21 ASSESSMENT — ACTIVITIES OF DAILY LIVING (ADL)
DRESS: INDEPENDENT
ORAL_HYGIENE: INDEPENDENT
LAUNDRY: WITH SUPERVISION
HYGIENE/GROOMING: INDEPENDENT

## 2022-05-21 NOTE — PLAN OF CARE
Problem: Plan of Care - These are the overarching goals to be used throughout the patient stay.    Goal: Optimal Comfort and Wellbeing  Outcome: Ongoing, Progressing   Goal Outcome Evaluation:    Plan of Care Reviewed With: patient     This evening, the patient maintained a calm demeanor, full range affect, and welcoming manner. He interacted with his peers in the lounge while he was not in his room. SI, AVHs, and any other mental health SxS were all denied by him. He had a nap before dinner and was able to participate in the evening group activities. He took his meds without any negative side effects. He's eating, sleeping, and moving his bowels normally. This evening, Pt had an unremarkable visit with his ex-girlfriend. He did not go to the evening group activity.

## 2022-05-21 NOTE — PLAN OF CARE
Problem: Behavioral Health Plan of Care  Goal: Adheres to Safety Considerations for Self and Others  Outcome: Ongoing, Progressing     The pt is observed at the nurses' station, ambulating in the hallway, and sitting in the dining room during the first part of the shift. Is observed sitting in the lounge watching tv, attending therapeutic recreation group, and playing cards with peers during the second part of the shift. Reports that he has spent the day listening to music, watching golf on tv, and had a visit with his father which he assesses as a positive experience. Reports that he is awaiting discharge, hoping that this may occur next Monday, 5/23. Reports that he has made progress with his plan of care as noted by his working with a friend to manage his personal needs while hospitalized, including caring for his dog. Reports that he will likely have outpt substance abuse treatment follow up which he reports he has previously engaged in, and thinks will be helpful. Denies SI/SIB, HI, hallucinations, anxiety, depression, and pain. Affect is mildly restricted; mood is euthymic; behavior is calm. The pt is compliant with HS medication administration.

## 2022-05-21 NOTE — PROGRESS NOTES
Pt appears to be sleeping 6.75 hrs. No behaviors noted. Pt on status 15 min check. Will continue to monitor.

## 2022-05-21 NOTE — PLAN OF CARE
"Patient slept until the breakfast cart arrived and ate 100%.  He was somewhat more interactive with peers but tends to mostly keep to himself.  He is pleasant and cooperative upon approach.  Watched TV in the lounge, and rested in his room intermittently.  He asked about how long he would be in the hospital but was understanding that we cannot say with 100% certainty but to discuss with his provider on Monday which would be the first potential day for discharge.  Denies SI, SIBI, homicidal ideation and reports he does not remember having these thoughts prior to admission.  BP (!) 142/103 (BP Location: Left arm, Patient Position: Sitting, Cuff Size: Adult Regular)   Pulse 72   Temp 97.9  F (36.6  C) (Oral)   Resp 16   Ht 1.803 m (5' 11\")   Wt 79.4 kg (175 lb)   SpO2 100%   BMI 24.41 kg/m    "

## 2022-05-22 PROCEDURE — 124N000002 HC R&B MH UMMC

## 2022-05-22 PROCEDURE — 250N000013 HC RX MED GY IP 250 OP 250 PS 637: Performed by: PSYCHIATRY & NEUROLOGY

## 2022-05-22 RX ADMIN — PROPRANOLOL HYDROCHLORIDE 60 MG: 60 CAPSULE, EXTENDED RELEASE ORAL at 09:45

## 2022-05-22 RX ADMIN — LAMOTRIGINE 25 MG: 25 TABLET ORAL at 09:43

## 2022-05-22 RX ADMIN — LITHIUM CARBONATE 600 MG: 300 TABLET, EXTENDED RELEASE ORAL at 21:44

## 2022-05-22 RX ADMIN — QUETIAPINE 100 MG: 100 TABLET ORAL at 21:45

## 2022-05-22 RX ADMIN — LITHIUM CARBONATE 600 MG: 300 TABLET, EXTENDED RELEASE ORAL at 09:45

## 2022-05-22 ASSESSMENT — ACTIVITIES OF DAILY LIVING (ADL)
ORAL_HYGIENE: INDEPENDENT
DRESS: INDEPENDENT
LAUNDRY: WITH SUPERVISION
HYGIENE/GROOMING: INDEPENDENT

## 2022-05-22 NOTE — PROGRESS NOTES
05/21/22 2100   Group Therapy Session   Group Attendance attended group session   Time Session Began 2000   Time Session Ended 2100   Total Time patient participated (minutes) 50   Total # Attendees 4   Group Type recreation   Group Topic Covered relaxation techniques   Group Session Detail stress reduction   Patient Response/Contribution cooperative with task   Patient Response Detail Pt actively participated in a structured Therapeutic Recreation group with a focus on leisure participation, socializing, and exercise. Pt participated in the guided exercise for the full duration of the group. Pt followed along, engaged in the guided chair exercise routine and added to the discussion prompts throughout the routine.  Pt was encouraged to use positive imagery with the deep breathing and stretching to foster relaxation, improves focus, and reduce stress.

## 2022-05-22 NOTE — PLAN OF CARE
Goal Outcome Evaluation: Pt has not established goal for today.      Problem: Plan of Care - These are the overarching goals to be used throughout the patient stay.    Goal: Optimal Comfort and Wellbeing  Outcome: Ongoing, Progressing     Problem: Behavioral Health Plan of Care  Goal: Adheres to Safety Considerations for Self and Others  Outcome: Ongoing, Progressing     Pt out for slightly late breakfast. Coop with medications. Denies pain. When asked about his discharge plan, pt states that he would like to find out his status. Writer reviews chart, SW references improving outpatient supports, will update pt when he is awake again.    Room checks/sweeps performed per unit protocol.    Risa Quezada RN

## 2022-05-22 NOTE — PROGRESS NOTES
NOC Shift Report    Pt in bed at beginning of shift, breathing quiet and unlabored. Pt slept through shift. Pt slept  hours. No pt complaints or concerns at this time. Will continue to monitor.

## 2022-05-22 NOTE — PLAN OF CARE
"Problem: Behavioral Health Plan of Care  Goal: Adheres to Safety Considerations for Self and Others  Outcome: Ongoing, Progressing     Pt is observed watching tv and socializing and playing cards with peers in the lounge during the the shift. The pt took a shower during the first part of the shift and asked a PsyA if he could wear his own clothes afterwards. PsyA informed the pt, after consulting with me, that he was not able to do so at this time due to his precaution status, but could follow up with his provider tomorrow about this. The PsyA reported to this writer that the pt appeared to understand this information. I also later reconfirmed this information with the pt and let him know to discuss this and access to food in his locker, which he desires, with his provider. The pt's mother (Wendy) phoned the unit, requesting to speak to this writer; ANDRES is not signed, so no information was provided to her about the pt. The pt's mother asked if there were unit rules concerning pts' being able to wear their own clothes and having persons bring in food to them. Informed her that each pt's care is individualized and their provider assesses and determines the nature and scope of patient participation relative to each of these matters. The pt's mother verbalized understanding of this information, reporting that the pt, during a phone call with her, seemed \"psychotic\" or confused about the requirements relative to each; thus, the purpose of her call.  The pt reports that he watched tv, used the exercise bike, took a nap, and had a visit with a friend today. Is looking forward to meeting with his provider tomorrow to discuss his discharge date and plan. Denies SI/SIB, HI, hallucinations, depression, anxiety, and pain. Affect is broad; mood is euthymic. The pt is compliant with HS medication administration.  "

## 2022-05-23 VITALS
TEMPERATURE: 97.6 F | WEIGHT: 175 LBS | HEART RATE: 68 BPM | OXYGEN SATURATION: 99 % | RESPIRATION RATE: 16 BRPM | BODY MASS INDEX: 24.5 KG/M2 | SYSTOLIC BLOOD PRESSURE: 133 MMHG | DIASTOLIC BLOOD PRESSURE: 87 MMHG | HEIGHT: 71 IN

## 2022-05-23 LAB — LITHIUM SERPL-SCNC: 0.8 MMOL/L

## 2022-05-23 PROCEDURE — 99239 HOSP IP/OBS DSCHRG MGMT >30: CPT | Performed by: NURSE PRACTITIONER

## 2022-05-23 PROCEDURE — 250N000013 HC RX MED GY IP 250 OP 250 PS 637: Performed by: PSYCHIATRY & NEUROLOGY

## 2022-05-23 PROCEDURE — 36415 COLL VENOUS BLD VENIPUNCTURE: CPT | Performed by: NURSE PRACTITIONER

## 2022-05-23 PROCEDURE — 80178 ASSAY OF LITHIUM: CPT | Performed by: NURSE PRACTITIONER

## 2022-05-23 RX ORDER — ALBUTEROL SULFATE 90 UG/1
1-2 AEROSOL, METERED RESPIRATORY (INHALATION) EVERY 4 HOURS PRN
Qty: 18 G | Refills: 1 | Status: SHIPPED | OUTPATIENT
Start: 2022-05-23

## 2022-05-23 RX ORDER — LITHIUM CARBONATE 300 MG/1
600 TABLET, FILM COATED, EXTENDED RELEASE ORAL 2 TIMES DAILY
Qty: 120 TABLET | Refills: 1 | Status: SHIPPED | OUTPATIENT
Start: 2022-05-23

## 2022-05-23 RX ORDER — PROPRANOLOL HCL 60 MG
60 CAPSULE, EXTENDED RELEASE 24HR ORAL DAILY
Qty: 30 CAPSULE | Refills: 1 | Status: SHIPPED | OUTPATIENT
Start: 2022-05-23

## 2022-05-23 RX ORDER — QUETIAPINE FUMARATE 100 MG/1
100 TABLET, FILM COATED ORAL AT BEDTIME
Qty: 30 TABLET | Refills: 1 | Status: SHIPPED | OUTPATIENT
Start: 2022-05-23

## 2022-05-23 RX ORDER — LAMOTRIGINE 25 MG/1
25 TABLET ORAL DAILY
Qty: 64 TABLET | Refills: 0 | Status: SHIPPED | OUTPATIENT
Start: 2022-05-24

## 2022-05-23 RX ADMIN — PROPRANOLOL HYDROCHLORIDE 60 MG: 60 CAPSULE, EXTENDED RELEASE ORAL at 09:29

## 2022-05-23 RX ADMIN — LITHIUM CARBONATE 600 MG: 300 TABLET, EXTENDED RELEASE ORAL at 09:00

## 2022-05-23 RX ADMIN — LAMOTRIGINE 25 MG: 25 TABLET ORAL at 09:00

## 2022-05-23 ASSESSMENT — ACTIVITIES OF DAILY LIVING (ADL)
ORAL_HYGIENE: INDEPENDENT
HYGIENE/GROOMING: HANDWASHING;INDEPENDENT;SHOWER
DRESS: SCRUBS (BEHAVIORAL HEALTH);INDEPENDENT

## 2022-05-23 NOTE — PROGRESS NOTES
Pt appears to be sleeping 7 hrs. Pt on status 15 min check. No other concerns. Will continue to monitor.

## 2022-05-23 NOTE — PLAN OF CARE
Problem: Behavioral Health Plan of Care  Goal: Team Discussion  Outcome: Adequate for Care Transition     Pt is observed sitting in the lounge playing cards with peers during the first part of the shift. Reports is waiting on friend to pick him up for discharge at approximately 1630. Pt has AVS, reports his assigned day nurse went over this with him, including his discharge medications. Pt verbalizes that he has outpt psychiatric follow up to schedule at this time. Pt denies SI/SIB; denies access to guns once discharged. Pt's affect is broad; mood is euthymic. Pt was provided with his belongings and discharge medications, and accompanied to the building door by a psychiatric associate, leaving the unit at approximately 1645.
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
## 2022-05-23 NOTE — PLAN OF CARE
Tasks Complete:    CTC spoke with pt about discharge planning. Pt noted that he will schedule OP appointment. Pt friend will transport pt home.     Post round meeting with team @9:30 am updates: Team discussed that pt will likely discharge today. PT is requesting OP CD resources/ treatment.    Current Symptoms include the following: Pt was pleasant and cooperative. He stated he would attend groups and take his medications     Addressed patient needs/concerns: CTC spoke with pt about discharge planning. Pt stated that he would like to independently schedule his OP appointments. This is noted in AVS    Discharge Plan or Goal   Plan  Discharge home with OP services        Barriers to Discharge   No barriers noted- pt will discharge before 430pm today      Referral Status  OP CD treatment provided for pt     Legal Status  Voluntary

## 2022-05-23 NOTE — PROGRESS NOTES
Pt was given his AVS. He verbalizes understanding. He is planning to discharge as per avs, at around 1630, w a friend.ID pharmacy said they will be sending his discharge meds, very soon. Marlen MARKHAM, has gone through pt's belongings. Pt remains calm and in good spirits. See discharge CHIDI also.

## 2022-05-24 ENCOUNTER — PATIENT OUTREACH (OUTPATIENT)
Dept: CARE COORDINATION | Facility: CLINIC | Age: 32
End: 2022-05-24
Payer: COMMERCIAL

## 2022-05-24 DIAGNOSIS — Z71.89 OTHER SPECIFIED COUNSELING: ICD-10-CM

## 2022-05-24 NOTE — PROGRESS NOTES
Clinic Care Coordination Contact  [facility]/Voicemail       Clinical Data: Care Coordinator Outreach    Outreach attempted x 1.  attempted to call patient at number listed.  Receive message that the number is no longer in service.    Plan: Care Coordinator will do no further outreaches at this time.    Apryl Hooper, [facility]  [phone]  [facility]

## 2022-05-26 ENCOUNTER — TELEPHONE (OUTPATIENT)
Dept: PHARMACY | Facility: OTHER | Age: 32
End: 2022-05-26
Payer: COMMERCIAL

## 2022-05-26 NOTE — TELEPHONE ENCOUNTER
MTM referral from: Transitions of Care (recent hospital discharge or ED visit)    MTM referral outreach attempt #2 on May 26, 2022 at 2:59 PM      Outcome: Patient not reachable after several attempts, will route to [facility] Pharmacist/Provider as an FYI.  [facility] scheduling number is [phone].  Thank you for the referral.    Lyndsay Patrick [facility]

## 2022-07-24 ENCOUNTER — HEALTH MAINTENANCE LETTER (OUTPATIENT)
Age: 32
End: 2022-07-24

## 2022-10-03 ENCOUNTER — HEALTH MAINTENANCE LETTER (OUTPATIENT)
Age: 32
End: 2022-10-03

## 2022-11-04 NOTE — ED TRIAGE NOTES
BIB friend for concern for mary, not sleeping, delusional, VH. Pt says he takes lithium, lamictal, seroquel as precribed. Pt says he took 4 danya tablets 2 nights ago. Pt is calm, flat. Denies SI, HI.     Triage Assessment     Row Name 05/16/22 2001       Triage Assessment (Adult)    Airway WDL WDL       Respiratory WDL    Respiratory WDL WDL       Skin Circulation/Temperature WDL    Skin Circulation/Temperature WDL WDL       Cardiac WDL    Cardiac WDL WDL       Peripheral/Neurovascular WDL    Peripheral Neurovascular WDL WDL       Cognitive/Neuro/Behavioral WDL    Cognitive/Neuro/Behavioral WDL mood/behavior    Mood/Behavior flat affect               FORMULARY STATUS   TIER  PA REQUIREMENT  Jardiance 10MG tablets   Covered Tier 2 Required  Glimepiride 1 Mg Tab   Covered Tier 1 Not Required  Glipizide 10 Mg Tab    Covered Tier 1 Not Required  Glipizide 10 Mg Tr24   Covered Tier 1 Not Required  Glipizide-Metformin 2.5-250 Mg Tab Covered Tier 3 Not Required  Metformin 1,000 Mg Tab   Covered Tier 1 Not Required  Metformin 500 Mg Tb24   Covered Tier 1 Not Required  Pioglitazone 15 Mg Tab   Covered Tier 1 Not Required  Synjardy 12.5-1,000 Mg Tab  Covered Tier 2 Not Required  Synjardy XR 5-1,000 Mg TBph  Covered Tier 2 Not Required  Tradjenta     Covered Tier 2 Not Required  Trijardy XR 5-2.5-1,000 Mg TBph  Covered Tier 2 Not Required      PA submitted, awaiting results

## 2023-08-13 ENCOUNTER — HEALTH MAINTENANCE LETTER (OUTPATIENT)
Age: 33
End: 2023-08-13

## 2024-10-06 ENCOUNTER — HEALTH MAINTENANCE LETTER (OUTPATIENT)
Age: 34
End: 2024-10-06